# Patient Record
Sex: FEMALE | Race: WHITE | Employment: FULL TIME | URBAN - METROPOLITAN AREA
[De-identification: names, ages, dates, MRNs, and addresses within clinical notes are randomized per-mention and may not be internally consistent; named-entity substitution may affect disease eponyms.]

---

## 2017-01-04 ENCOUNTER — TELEPHONE (OUTPATIENT)
Dept: INTERNAL MEDICINE CLINIC | Facility: CLINIC | Age: 57
End: 2017-01-04

## 2017-01-04 NOTE — TELEPHONE ENCOUNTER
Incoming (mail or fax):  fax  Received from:  [facility]  Documentation given to:  Dr. Solomon Harris

## 2017-01-10 ENCOUNTER — MED REC SCAN ONLY (OUTPATIENT)
Dept: INTERNAL MEDICINE CLINIC | Facility: CLINIC | Age: 57
End: 2017-01-10

## 2017-05-11 ENCOUNTER — TELEPHONE (OUTPATIENT)
Dept: INTERNAL MEDICINE CLINIC | Facility: CLINIC | Age: 57
End: 2017-05-11

## 2017-05-11 NOTE — TELEPHONE ENCOUNTER
Received copy of multiple labs results done between 3/2016 and 4/2017 at [facility]. Ordered by Dr. Bernard Rodrigues. Results in consult folder.

## 2017-05-13 PROBLEM — R76.8 ANA POSITIVE: Status: ACTIVE | Noted: 2017-05-13

## 2017-05-16 ENCOUNTER — TELEPHONE (OUTPATIENT)
Dept: INTERNAL MEDICINE CLINIC | Facility: CLINIC | Age: 57
End: 2017-05-16

## 2017-05-16 NOTE — TELEPHONE ENCOUNTER
Incoming (mail or fax): Fax  Received from:  Dr. Stephany Peralta at [facility]  Documentation given to:  Dr. Estes Mouse consult folder.

## 2017-11-15 ENCOUNTER — HOSPITAL ENCOUNTER (OUTPATIENT)
Dept: MAMMOGRAPHY | Facility: HOSPITAL | Age: 57
Discharge: HOME | End: 2017-11-15
Attending: SURGERY
Payer: COMMERCIAL

## 2017-11-15 DIAGNOSIS — R92.1 BREAST CALCIFICATIONS: ICD-10-CM

## 2017-11-15 PROCEDURE — 77062 BREAST TOMOSYNTHESIS BI: CPT | Performed by: SURGERY

## 2017-11-15 PROCEDURE — 77066 DX MAMMO INCL CAD BI: CPT | Performed by: SURGERY

## 2017-11-15 PROCEDURE — 76642 ULTRASOUND BREAST LIMITED: CPT | Performed by: SURGERY

## 2017-11-25 ENCOUNTER — TELEPHONE (OUTPATIENT)
Dept: INTERNAL MEDICINE CLINIC | Facility: CLINIC | Age: 57
End: 2017-11-25

## 2017-11-27 ENCOUNTER — TELEPHONE (OUTPATIENT)
Dept: INTERNAL MEDICINE CLINIC | Facility: CLINIC | Age: 57
End: 2017-11-27

## 2017-11-27 NOTE — TELEPHONE ENCOUNTER
Incoming (mail or fax):  fax  Received from:  [address]  Documentation given to:  Dr Gaylene Galeazzi

## 2017-11-27 NOTE — TELEPHONE ENCOUNTER
Received fax from [facility] [place] re: labs done on 11/1/17. Included CMP and CBC within normal limits. To consult folder.

## 2018-03-21 ENCOUNTER — TELEPHONE (OUTPATIENT)
Dept: INTERNAL MEDICINE CLINIC | Facility: CLINIC | Age: 58
End: 2018-03-21

## 2018-09-11 ENCOUNTER — TELEPHONE (OUTPATIENT)
Dept: INTERNAL MEDICINE CLINIC | Facility: CLINIC | Age: 58
End: 2018-09-11

## 2018-09-11 NOTE — TELEPHONE ENCOUNTER
Incoming (mail or fax):  fax  Received from:  [facility]  Documentation given to:  Dr Delia Hale

## 2018-10-19 LAB
AMB EXT CREATININE: 1 MG/DL
AMB EXT GFR: 61
AMB EXT GLUCOSE: 86 MG/DL
AMB EXT HEMATOCRIT: 40.5
AMB EXT HEMOGLOBIN: 12.9
AMB EXT MCV: 92
AMB EXT PLATELETS: 298
AMB EXT WBC: 6.4 X10(3)UL

## 2018-10-30 ENCOUNTER — TELEPHONE (OUTPATIENT)
Dept: INTERNAL MEDICINE CLINIC | Facility: CLINIC | Age: 58
End: 2018-10-30

## 2018-10-30 NOTE — TELEPHONE ENCOUNTER
Lab test results received from [facility] and placed in folder. Routed to [facility].

## 2018-10-30 NOTE — TELEPHONE ENCOUNTER
Incoming (mail or fax):   Fax  Received from: [email or web address]  Documentation given to: Triage ([address])

## 2018-11-21 ENCOUNTER — HOSPITAL ENCOUNTER (OUTPATIENT)
Dept: MAMMOGRAPHY | Facility: HOSPITAL | Age: 58
Discharge: HOME | End: 2018-11-21
Attending: SURGERY
Payer: COMMERCIAL

## 2018-11-21 DIAGNOSIS — R92.1 MAMMOGRAPHIC CALCIFICATION: ICD-10-CM

## 2018-11-21 PROCEDURE — 77066 DX MAMMO INCL CAD BI: CPT | Performed by: SURGERY

## 2018-11-21 PROCEDURE — 76642 ULTRASOUND BREAST LIMITED: CPT | Performed by: SURGERY

## 2018-11-21 PROCEDURE — 77062 BREAST TOMOSYNTHESIS BI: CPT | Performed by: SURGERY

## 2018-12-12 ENCOUNTER — OFFICE VISIT (OUTPATIENT)
Dept: INTERNAL MEDICINE CLINIC | Facility: CLINIC | Age: 58
End: 2018-12-12
Payer: COMMERCIAL

## 2018-12-12 VITALS
HEIGHT: 64 IN | HEART RATE: 73 BPM | BODY MASS INDEX: 28.92 KG/M2 | TEMPERATURE: 98 F | DIASTOLIC BLOOD PRESSURE: 82 MMHG | SYSTOLIC BLOOD PRESSURE: 142 MMHG | OXYGEN SATURATION: 97 % | RESPIRATION RATE: 14 BRPM | WEIGHT: 169.38 LBS

## 2018-12-12 DIAGNOSIS — J45.20 MILD INTERMITTENT EXTRINSIC ASTHMA WITHOUT COMPLICATION: ICD-10-CM

## 2018-12-12 DIAGNOSIS — I10 ESSENTIAL HYPERTENSION: Primary | ICD-10-CM

## 2018-12-12 PROCEDURE — 99213 OFFICE O/P EST LOW 20 MIN: CPT | Performed by: NURSE PRACTITIONER

## 2018-12-12 RX ORDER — LISINOPRIL 10 MG/1
10 TABLET ORAL DAILY
Qty: 30 TABLET | Refills: 1 | Status: SHIPPED | OUTPATIENT
Start: 2018-12-12 | End: 2019-01-09

## 2018-12-12 NOTE — PROGRESS NOTES
Patient presents with:  Blood Pressure      HPI:  Presents for follow up of blood pressure. Stated was at [facility] office for routine visit and was told BP was slightly high.  Stated her mother's caregiver also checked her BP once and told her is was a little hig ankylosing (HCC)     HLA B27 (HLA B27 positive)     DOROTHY positive        Current Outpatient Medications:  Fexofenadine HCl 180 MG Oral Tab daily. Disp:  Rfl: 0   Meloxicam 15 MG Oral Tab Take 1 tablet by mouth daily.  Disp:  Rfl: 2   Secukinumab (COSENTYX SE in 4 weeks with BP record. Aware medications may need adjustment. Verbalized understanding of instructions and agreeable to this plan of care. Mild intermittent extrinsic asthma without complication- Stable. Continue current management.  ACT reviewed an

## 2018-12-12 NOTE — PATIENT INSTRUCTIONS
Get home blood pressure cuff and check blood pressure 2-3 times weekly. Keep record of readings and bring to office with you.      Sit calmly for 5 minutes prior to checking blood pressure and sit up straight with feet flat on the fl

## 2018-12-14 RX ORDER — LISINOPRIL 10 MG/1
TABLET ORAL
Qty: 90 TABLET | Refills: 1 | OUTPATIENT
Start: 2018-12-14

## 2018-12-31 ENCOUNTER — TELEPHONE (OUTPATIENT)
Dept: INTERNAL MEDICINE CLINIC | Facility: CLINIC | Age: 58
End: 2018-12-31

## 2018-12-31 DIAGNOSIS — I10 ESSENTIAL HYPERTENSION: Primary | ICD-10-CM

## 2018-12-31 DIAGNOSIS — E66.3 OVERWEIGHT (BMI 25.0-29.9): ICD-10-CM

## 2018-12-31 NOTE — TELEPHONE ENCOUNTER
Dre Love, HEATHER  [facility] Clinical Staff             I am not sure why this patient is going to this provider. I do not recall discussing nutrition with her. Please check with her and see why she is going (weight loss, fatigue etc.? ) and how to place ref

## 2019-01-02 NOTE — TELEPHONE ENCOUNTER
Spoke with Dale Barber at [address] [place] [address], she reviewed which referral to place, pended. Which Dx would I use? Essential HTN added, but for weight loss and possible arthritic pain (anti-inflammatory diet)?   Carmen Sigala referral likely will be placed tomorro

## 2019-01-08 ENCOUNTER — OFFICE VISIT (OUTPATIENT)
Dept: INTEGRATIVE MEDICINE | Facility: CLINIC | Age: 59
End: 2019-01-08
Payer: COMMERCIAL

## 2019-01-08 DIAGNOSIS — E66.3 OVERWEIGHT (BMI 25.0-29.9): ICD-10-CM

## 2019-01-08 DIAGNOSIS — I10 ESSENTIAL HYPERTENSION: ICD-10-CM

## 2019-01-08 PROCEDURE — 97802 MEDICAL NUTRITION INDIV IN: CPT | Performed by: NUTRITIONIST

## 2019-01-08 NOTE — PATIENT INSTRUCTIONS
The products and items listed below (the “Products”)  and their claims have not been evaluated by the Food and Drug Administration. Dietary products are not intended to treat, prevent, mitigate or cure disease.  Ultimately, you must draw your own conclusi Measurements  Chest Waist Hips L arm R arm L thigh R thigh Total   37 34 39 13 13 21 21 178   ___________  For any questions or concerns you may have, please do not hesitate to contact via   My Chart OR email- [email or web address]

## 2019-01-08 NOTE — PROGRESS NOTES
Patient referred by Dr. Joseph Chambers  Did patient complete Nutritional Therapy Questionnaire? Loreto Sharp is a 63year old female presenting for medical nutrition therapy in regards to weight loss.   Patient also has concerns with elevated blood pressure added sugar intake- work her way to 25 grams per day. Patient is snacking on sugary items at night, patient has to prioritize this change to stop. Also, educate on oils to cook at high temperatures.   Once patient has made some of these changes and are co

## 2019-01-09 ENCOUNTER — OFFICE VISIT (OUTPATIENT)
Dept: INTERNAL MEDICINE CLINIC | Facility: CLINIC | Age: 59
End: 2019-01-09
Payer: COMMERCIAL

## 2019-01-09 VITALS
HEIGHT: 64 IN | SYSTOLIC BLOOD PRESSURE: 122 MMHG | RESPIRATION RATE: 16 BRPM | WEIGHT: 168 LBS | HEART RATE: 72 BPM | BODY MASS INDEX: 28.68 KG/M2 | DIASTOLIC BLOOD PRESSURE: 68 MMHG

## 2019-01-09 DIAGNOSIS — I10 ESSENTIAL HYPERTENSION: Primary | ICD-10-CM

## 2019-01-09 PROCEDURE — 99213 OFFICE O/P EST LOW 20 MIN: CPT | Performed by: NURSE PRACTITIONER

## 2019-01-09 RX ORDER — AMLODIPINE BESYLATE 5 MG/1
5 TABLET ORAL DAILY
Qty: 30 TABLET | Refills: 1 | Status: SHIPPED | OUTPATIENT
Start: 2019-01-09 | End: 2019-01-09

## 2019-01-09 NOTE — PROGRESS NOTES
Deb Arias is a 63year old female. HPI:       Patient presents for follow up of HTN. Stated taking medications as ordered, w/o reported side effects. Is checking BP at home. Reported in the range of 120/80's.  Does feel she is having persistent \" (pulmonary embolism) 8/06    in the lungs - 5 years ago, attributed to [facility]   • Radiculopathy     of lumbosacral spine, cervical spine   • Spondylitis, ankylosing ([facility] [address] 9/13/2016   • subclinical hyperthyroidism     normalized   • Vitamin B 12 deficiency Sig: Take 1 tablet (5 mg total) by mouth daily. Imaging & Consults:  None    Return in about 4 weeks (around 2/6/2019). There are no Patient Instructions on file for this visit.

## 2019-01-11 RX ORDER — AMLODIPINE BESYLATE 5 MG/1
TABLET ORAL
Qty: 90 TABLET | Refills: 0 | Status: SHIPPED | OUTPATIENT
Start: 2019-01-11 | End: 2019-03-12

## 2019-01-29 ENCOUNTER — OFFICE VISIT (OUTPATIENT)
Dept: INTEGRATIVE MEDICINE | Facility: CLINIC | Age: 59
End: 2019-01-29
Payer: COMMERCIAL

## 2019-01-29 DIAGNOSIS — E66.3 OVERWEIGHT (BMI 25.0-29.9): ICD-10-CM

## 2019-01-29 DIAGNOSIS — I10 ESSENTIAL HYPERTENSION: ICD-10-CM

## 2019-01-29 PROCEDURE — 97803 MED NUTRITION INDIV SUBSEQ: CPT | Performed by: NUTRITIONIST

## 2019-01-29 NOTE — PROGRESS NOTES
Patient referred by Dr. Brooke More  Did patient complete Nutritional Therapy Questionnaire? Kwasi Cazares is a 63year old female presenting for medical nutrition therapy in regards to weight loss.  Since last visit, patient has changed her oils and has

## 2019-02-06 LAB
AMB EXT CREATININE: 0.9 MG/DL
AMB EXT GLUCOSE: 85 MG/DL
AMB EXT HEMATOCRIT: 40.3
AMB EXT HEMOGLOBIN: 13
AMB EXT MCV: 90
AMB EXT PLATELETS: 288
AMB EXT WBC: 6 X10(3)UL

## 2019-02-12 ENCOUNTER — OFFICE VISIT (OUTPATIENT)
Dept: INTERNAL MEDICINE CLINIC | Facility: CLINIC | Age: 59
End: 2019-02-12
Payer: COMMERCIAL

## 2019-02-12 VITALS
SYSTOLIC BLOOD PRESSURE: 130 MMHG | HEIGHT: 64 IN | HEART RATE: 76 BPM | BODY MASS INDEX: 28.03 KG/M2 | RESPIRATION RATE: 14 BRPM | DIASTOLIC BLOOD PRESSURE: 72 MMHG | WEIGHT: 164.19 LBS | OXYGEN SATURATION: 99 % | TEMPERATURE: 98 F

## 2019-02-12 DIAGNOSIS — I10 ESSENTIAL HYPERTENSION: Primary | ICD-10-CM

## 2019-02-12 DIAGNOSIS — K59.00 CONSTIPATION, UNSPECIFIED CONSTIPATION TYPE: ICD-10-CM

## 2019-02-12 PROCEDURE — 99213 OFFICE O/P EST LOW 20 MIN: CPT | Performed by: NURSE PRACTITIONER

## 2019-02-12 RX ORDER — LOSARTAN POTASSIUM 25 MG/1
25 TABLET ORAL DAILY
Qty: 30 TABLET | Refills: 1 | Status: SHIPPED | OUTPATIENT
Start: 2019-02-12 | End: 2019-03-12

## 2019-02-12 NOTE — PROGRESS NOTES
Dianna Thomas is a 63year old female. HPI:     Patient presents for follow up of HTN. At last visit on 1/9/19, lisinopril was stopped due to perception of \"constant tickle\" in her throat, and patient was started on amlodipine.  In office today stat • HLA B27 (HLA B27 positive) 12/22/2016   • Nipple discharge     left, bloody skin fissure   • PE (pulmonary embolism) 8/06    in the lungs - 5 years ago, attributed to [facility]   • Radiculopathy     of lumbosacral spine, cervical spine   • Spondylitis, ankylo Constipation, unspecified constipation type- related to amlodipine? Change medications as above. Notify office if no improvement in constipation. Verbalized understanding of instructions and agreeable to this plan of care.        No orders of the defined

## 2019-02-13 RX ORDER — LOSARTAN POTASSIUM 25 MG/1
TABLET ORAL
Qty: 90 TABLET | Refills: 1 | OUTPATIENT
Start: 2019-02-13

## 2019-02-14 ENCOUNTER — MED REC SCAN ONLY (OUTPATIENT)
Dept: INTERNAL MEDICINE CLINIC | Facility: CLINIC | Age: 59
End: 2019-02-14

## 2019-03-05 ENCOUNTER — OFFICE VISIT (OUTPATIENT)
Dept: INTEGRATIVE MEDICINE | Facility: CLINIC | Age: 59
End: 2019-03-05
Payer: COMMERCIAL

## 2019-03-05 DIAGNOSIS — I10 ESSENTIAL HYPERTENSION: ICD-10-CM

## 2019-03-05 DIAGNOSIS — Z71.3 NUTRITIONAL COUNSELING: ICD-10-CM

## 2019-03-05 DIAGNOSIS — E66.3 OVERWEIGHT (BMI 25.0-29.9): ICD-10-CM

## 2019-03-05 PROCEDURE — 97803 MED NUTRITION INDIV SUBSEQ: CPT | Performed by: NUTRITIONIST

## 2019-03-05 NOTE — PROGRESS NOTES
Patient referred by Dr. Lori Fuentes  Did patient complete Nutritional Therapy Questionnaire? Geeta Bowles is a 63year old female presenting for medical nutrition therapy in regards to weight loss.   Patient reports doing very well during the week with h

## 2019-03-06 ENCOUNTER — TELEPHONE (OUTPATIENT)
Dept: INTERNAL MEDICINE CLINIC | Facility: CLINIC | Age: 59
End: 2019-03-06

## 2019-03-06 NOTE — TELEPHONE ENCOUNTER
Incoming (mail or fax):  Fax  Received from: [facility]   Documentation given to: Triage (Blade Parker)    *Test Results

## 2019-03-06 NOTE — TELEPHONE ENCOUNTER
Labs ordered by rheumatologist, dated 2/6/19 received: CBC, CMP, Vit D  Routed to [facility] for review.

## 2019-03-07 NOTE — TELEPHONE ENCOUNTER
Labs noted. No significant abnormalities. Vit D slightly low, addressed by ordering provider per notes provided. All sent to scan.

## 2019-03-12 ENCOUNTER — OFFICE VISIT (OUTPATIENT)
Dept: INTERNAL MEDICINE CLINIC | Facility: CLINIC | Age: 59
End: 2019-03-12
Payer: COMMERCIAL

## 2019-03-12 VITALS
SYSTOLIC BLOOD PRESSURE: 118 MMHG | HEIGHT: 64 IN | HEART RATE: 88 BPM | RESPIRATION RATE: 16 BRPM | BODY MASS INDEX: 27.83 KG/M2 | DIASTOLIC BLOOD PRESSURE: 66 MMHG | WEIGHT: 163 LBS

## 2019-03-12 DIAGNOSIS — I10 ESSENTIAL HYPERTENSION: Primary | ICD-10-CM

## 2019-03-12 PROCEDURE — 99213 OFFICE O/P EST LOW 20 MIN: CPT | Performed by: NURSE PRACTITIONER

## 2019-03-12 RX ORDER — LOSARTAN POTASSIUM 25 MG/1
25 TABLET ORAL DAILY
Qty: 90 TABLET | Refills: 0 | Status: SHIPPED | OUTPATIENT
Start: 2019-03-12 | End: 2019-06-12

## 2019-03-12 NOTE — PROGRESS NOTES
Zechariah Casiano is a 63year old female presenting for follow up of HTN   Patient presents with:  Blood Pressure: check    HPI:       Patient presents for follow up of HTN. Stated taking medications as ordered, w/o reported side effects.  Is not checking BP Spondylitis, ankylosing ([facility] [address] 9/13/2016   • subclinical hyperthyroidism     normalized   • Vitamin B 12 deficiency     oral replacement      Past Surgical History:   Procedure Laterality Date   • COLONOSCOPY,BIOPSY  07/2011   • HIP REPLACEMENT SURGERY  Feb You are overdue for your annual physical exam. This is a good opportunity to meet Dr. Alexx Leavitt, or I would be happy to do your physical as well. Please schedule appointment for physical as soon as possible.

## 2019-03-12 NOTE — PATIENT INSTRUCTIONS
You are overdue for your annual physical exam. This is a good opportunity to meet Dr. Elvia Arnett, or I would be happy to do your physical as well. Please schedule appointment for physical as soon as possible.

## 2019-04-02 ENCOUNTER — OFFICE VISIT (OUTPATIENT)
Dept: INTEGRATIVE MEDICINE | Facility: CLINIC | Age: 59
End: 2019-04-02
Payer: COMMERCIAL

## 2019-04-02 DIAGNOSIS — I10 ESSENTIAL HYPERTENSION: ICD-10-CM

## 2019-04-02 DIAGNOSIS — E66.3 OVERWEIGHT (BMI 25.0-29.9): ICD-10-CM

## 2019-04-02 PROCEDURE — 97803 MED NUTRITION INDIV SUBSEQ: CPT | Performed by: NUTRITIONIST

## 2019-04-02 NOTE — PROGRESS NOTES
Patient referred by Dr. Fajardo Band  Did patient complete Nutritional Therapy Questionnaire? Raine Groves is a 63year old female presenting for medical nutrition therapy in regards to weight loss.   Just returned from [facility] on Sunday with sister

## 2019-05-02 ENCOUNTER — OFFICE VISIT (OUTPATIENT)
Dept: FAMILY MEDICINE CLINIC | Facility: CLINIC | Age: 59
End: 2019-05-02
Payer: COMMERCIAL

## 2019-05-02 VITALS — WEIGHT: 160.63 LBS | BODY MASS INDEX: 28 KG/M2

## 2019-05-02 DIAGNOSIS — E05.90 HYPERTHYROIDISM: ICD-10-CM

## 2019-05-02 DIAGNOSIS — I10 ESSENTIAL HYPERTENSION: Primary | ICD-10-CM

## 2019-05-02 PROCEDURE — 99214 OFFICE O/P EST MOD 30 MIN: CPT | Performed by: PHYSICIAN ASSISTANT

## 2019-05-02 NOTE — PATIENT INSTRUCTIONS
Do not punish yourself for cheating. Go back to eating healthy the next day. Stop DIET COKE! Stop eating after dinner. Stop cake and sweets. Reward yourself with something other than food. Keep up the workouts! You Can do it!

## 2019-05-02 NOTE — PROGRESS NOTES
Mayelin Lewis is a 63year old female. Patient presents with:  Nutrition Counseling      HPI:   Working out more. Stretching kickboxing, walking, strength training. Michael Alpha off her diet after having cake and wine on Easter.  Started having cake or a sweet skin fissure   • PE (pulmonary embolism) 8/06    in the lungs - 5 years ago, attributed to [facility]   • Radiculopathy     of lumbosacral spine, cervical spine   • Spondylitis, ankylosing ([facility] [address] 9/13/2016   • subclinical hyperthyroidism     normalized   • Vitamin 0.5 oz        Types: 1 drink(s) per week        Comment: rarely      Drug use: No      Sexual activity: Not on file    Lifestyle      Physical activity:        Days per week: Not on file        Minutes per session: Not on file      Stress: Not on file    R She is oriented to person, place, and time and well-developed, well-nourished, and in no distress. No distress. HENT:   Head: Normocephalic and atraumatic. Neurological: She is alert and oriented to person, place, and time.  Gait normal.   Skin: Skin is

## 2019-05-29 ENCOUNTER — HOSPITAL ENCOUNTER (OUTPATIENT)
Age: 59
Discharge: HOME | End: 2019-05-29
Attending: EMERGENCY MEDICINE
Payer: COMMERCIAL

## 2019-05-29 VITALS
DIASTOLIC BLOOD PRESSURE: 48 MMHG | TEMPERATURE: 101 F | OXYGEN SATURATION: 96 % | RESPIRATION RATE: 18 BRPM | SYSTOLIC BLOOD PRESSURE: 123 MMHG | HEART RATE: 86 BPM

## 2019-05-29 DIAGNOSIS — J20.9 ACUTE BRONCHITIS, UNSPECIFIED ORGANISM: Primary | ICD-10-CM

## 2019-05-29 PROCEDURE — 99204 OFFICE O/P NEW MOD 45 MIN: CPT

## 2019-05-29 PROCEDURE — 99213 OFFICE O/P EST LOW 20 MIN: CPT

## 2019-05-29 RX ORDER — AZITHROMYCIN 250 MG/1
TABLET, FILM COATED ORAL
Qty: 1 PACKAGE | Refills: 0 | Status: SHIPPED | OUTPATIENT
Start: 2019-05-29 | End: 2019-06-04 | Stop reason: ALTCHOICE

## 2019-05-29 NOTE — ED INITIAL ASSESSMENT (HPI)
The patient is here with complaints of a dry cough and some pain to the glands of her neck x 5-7 days. She states she believes the cough may be reactive airway. She has little to no relief with her inhaler.   She has had chills and felt warm but hasn't ta

## 2019-05-29 NOTE — ED PROVIDER NOTES
Patient Seen in: [address]    History   Patient presents with:  Cough/URI    Stated Complaint: cough, x5days     HPI    16-year-old female complaining of cough the patient had a dry cough for about 5 to 7days she denies an stated complaint: cough, x5days   Other systems are as noted in HPI. Constitutional and vital signs reviewed. All other systems reviewed and negative except as noted above.     Physical Exam     ED Triage Vitals [05/29/19 1620]   /48   Pulse 86

## 2019-06-03 ENCOUNTER — TELEPHONE (OUTPATIENT)
Dept: INTERNAL MEDICINE CLINIC | Facility: CLINIC | Age: 59
End: 2019-06-03

## 2019-06-03 NOTE — TELEPHONE ENCOUNTER
Patient calling was seen at [facility] 05-29-19 prescribed a z pack for bronchitis. Patient stated her fever has gone away but her cough is persistent and she now has mouth sores/tender gums.  Patient would like a call back today to schedule UC follow up amira

## 2019-06-03 NOTE — TELEPHONE ENCOUNTER
LM to follow up with patient. OK to schedule OV with Cedric Quintanilla for 6/4/19 when she returns the call.

## 2019-06-04 ENCOUNTER — OFFICE VISIT (OUTPATIENT)
Dept: INTERNAL MEDICINE CLINIC | Facility: CLINIC | Age: 59
End: 2019-06-04
Payer: COMMERCIAL

## 2019-06-04 VITALS
RESPIRATION RATE: 16 BRPM | HEIGHT: 64 IN | HEART RATE: 92 BPM | SYSTOLIC BLOOD PRESSURE: 112 MMHG | BODY MASS INDEX: 26.98 KG/M2 | OXYGEN SATURATION: 98 % | DIASTOLIC BLOOD PRESSURE: 58 MMHG | WEIGHT: 158 LBS | TEMPERATURE: 98 F

## 2019-06-04 DIAGNOSIS — R05.9 COUGH: Primary | ICD-10-CM

## 2019-06-04 DIAGNOSIS — K13.79 MOUTH SORES: ICD-10-CM

## 2019-06-04 PROCEDURE — 99213 OFFICE O/P EST LOW 20 MIN: CPT | Performed by: NURSE PRACTITIONER

## 2019-06-04 RX ORDER — PREDNISONE 10 MG/1
TABLET ORAL
Qty: 39 TABLET | Refills: 0 | Status: SHIPPED | OUTPATIENT
Start: 2019-06-04 | End: 2019-07-16

## 2019-06-04 NOTE — PATIENT INSTRUCTIONS
Prednisone 10mg tabs- take as below (take all tabs for the day in the morning, at the same time): Take 6 tabs daily for 3 days, take 4 tabs daily for 3 days, take 2 tabs daily for 3 days, take 1 tab daily for 3 days.

## 2019-06-04 NOTE — PROGRESS NOTES
Patient presents with:  Cough: very dry, she is thinking it is her asthma, swollen glands  Lesion: gums are very inflammed, she does have mouth sores       HPI:  Presents for follow up of UC visit on 5/29/19 for complaints of cough, was noted with elevated Extrinsic asthma     Hyperthyroidism     Cerebral ventriculomegaly     Carpal tunnel syndrome     Cubital tunnel syndrome     Vitamin B 12 deficiency     Radiculopathy     Depression     Anxiety     Back pain     Diverticulitis     Status post excision of without exudate or edema. Noted with several 1-2mm white appearing lesions w/o drainage or exudate. Buccual mucosa erythematous. (+)PND. No facial tenderness. Eyes: Conjunctivae are pink and moist without exudate or drainage.    Lymphadenopathy: No cervic

## 2019-06-12 ENCOUNTER — TELEPHONE (OUTPATIENT)
Dept: INTERNAL MEDICINE CLINIC | Facility: CLINIC | Age: 59
End: 2019-06-12

## 2019-06-12 ENCOUNTER — OFFICE VISIT (OUTPATIENT)
Dept: INTERNAL MEDICINE CLINIC | Facility: CLINIC | Age: 59
End: 2019-06-12
Payer: COMMERCIAL

## 2019-06-12 VITALS
DIASTOLIC BLOOD PRESSURE: 64 MMHG | SYSTOLIC BLOOD PRESSURE: 110 MMHG | HEIGHT: 64 IN | WEIGHT: 152 LBS | HEART RATE: 76 BPM | RESPIRATION RATE: 16 BRPM | BODY MASS INDEX: 25.95 KG/M2 | OXYGEN SATURATION: 98 % | TEMPERATURE: 98 F

## 2019-06-12 DIAGNOSIS — I10 ESSENTIAL HYPERTENSION: Primary | ICD-10-CM

## 2019-06-12 DIAGNOSIS — J02.9 SORE THROAT: Primary | ICD-10-CM

## 2019-06-12 DIAGNOSIS — R05.9 COUGH: ICD-10-CM

## 2019-06-12 PROCEDURE — 99213 OFFICE O/P EST LOW 20 MIN: CPT | Performed by: NURSE PRACTITIONER

## 2019-06-12 RX ORDER — MONTELUKAST SODIUM 10 MG/1
10 TABLET ORAL NIGHTLY
Qty: 30 TABLET | Refills: 0 | Status: SHIPPED | OUTPATIENT
Start: 2019-06-12 | End: 2020-04-01

## 2019-06-12 RX ORDER — AMOXICILLIN AND CLAVULANATE POTASSIUM 875; 125 MG/1; MG/1
1 TABLET, FILM COATED ORAL 2 TIMES DAILY
Qty: 20 TABLET | Refills: 0 | Status: SHIPPED | OUTPATIENT
Start: 2019-06-12 | End: 2019-06-22

## 2019-06-12 NOTE — PROGRESS NOTES
Patient presents with: Follow - Up: 1 week, getting cough back, sore thoat      HPI:  Presents for follow up of visit on 6/4/19 for complaints of cough and oral sores.  Was managed with prednisone at that visit (had previously been seen in [address] [place] [address] on 5/29/19 an B27 (HLA B27 positive)     DOROTHY positive     Essential hypertension        Current Outpatient Medications:  Montelukast Sodium 10 MG Oral Tab Take 1 tablet (10 mg total) by mouth nightly.  Disp: 30 tablet Rfl: 0   Amoxicillin-Pot Clavulanate 875-125 MG Oral drainage. Lymphadenopathy: No cervical adenopathy. Cardiovascular: Normal rate, regular rhythm. No murmur. Pulmonary/Chest: No respiratory distress. Effort normal. Breath sounds clear bilaterally. No wheezes, rhonchi or rales appreciated.  Stoney Patches

## 2019-06-13 RX ORDER — LOSARTAN POTASSIUM 25 MG/1
TABLET ORAL
Qty: 30 TABLET | Refills: 0 | Status: SHIPPED | OUTPATIENT
Start: 2019-06-13 | End: 2019-07-18

## 2019-06-13 RX ORDER — MONTELUKAST SODIUM 10 MG/1
TABLET ORAL
Qty: 90 TABLET | Refills: 0 | OUTPATIENT
Start: 2019-06-13

## 2019-06-13 NOTE — TELEPHONE ENCOUNTER
PSR - Overdue for PE. Please establish with Dr. Geneva Ricardo. Has not ever seen Dr. Jossie Lester, only [facility] [facility]/Dr. Corey Kirby. Thanks!

## 2019-06-19 ENCOUNTER — TELEPHONE (OUTPATIENT)
Dept: INTERNAL MEDICINE CLINIC | Facility: CLINIC | Age: 59
End: 2019-06-19

## 2019-07-16 ENCOUNTER — APPOINTMENT (OUTPATIENT)
Dept: LAB | Facility: REFERENCE LAB | Age: 59
End: 2019-07-16
Attending: FAMILY MEDICINE
Payer: COMMERCIAL

## 2019-07-16 ENCOUNTER — OFFICE VISIT (OUTPATIENT)
Dept: INTEGRATIVE MEDICINE | Facility: CLINIC | Age: 59
End: 2019-07-16
Payer: COMMERCIAL

## 2019-07-16 VITALS
DIASTOLIC BLOOD PRESSURE: 82 MMHG | BODY MASS INDEX: 25.78 KG/M2 | HEIGHT: 64 IN | SYSTOLIC BLOOD PRESSURE: 120 MMHG | HEART RATE: 72 BPM | WEIGHT: 151 LBS | OXYGEN SATURATION: 94 %

## 2019-07-16 DIAGNOSIS — R76.8 ANA POSITIVE: ICD-10-CM

## 2019-07-16 DIAGNOSIS — E66.3 OVERWEIGHT (BMI 25.0-29.9): Primary | ICD-10-CM

## 2019-07-16 DIAGNOSIS — Z15.89 HLA B27 (HLA B27 POSITIVE): ICD-10-CM

## 2019-07-16 DIAGNOSIS — E05.90 HYPERTHYROIDISM: ICD-10-CM

## 2019-07-16 DIAGNOSIS — J45.20 MILD INTERMITTENT EXTRINSIC ASTHMA WITHOUT COMPLICATION: ICD-10-CM

## 2019-07-16 DIAGNOSIS — E66.3 OVERWEIGHT (BMI 25.0-29.9): ICD-10-CM

## 2019-07-16 DIAGNOSIS — E53.8 VITAMIN B 12 DEFICIENCY: ICD-10-CM

## 2019-07-16 DIAGNOSIS — I10 ESSENTIAL HYPERTENSION: ICD-10-CM

## 2019-07-16 DIAGNOSIS — F32.A DEPRESSION, UNSPECIFIED DEPRESSION TYPE: ICD-10-CM

## 2019-07-16 DIAGNOSIS — F41.9 ANXIETY: ICD-10-CM

## 2019-07-16 DIAGNOSIS — R53.82 CHRONIC FATIGUE: ICD-10-CM

## 2019-07-16 DIAGNOSIS — M45.9 ANKYLOSING SPONDYLITIS, UNSPECIFIED SITE OF SPINE (HCC): ICD-10-CM

## 2019-07-16 LAB
DEPRECATED HBV CORE AB SER IA-ACNC: 76.2 NG/ML (ref 18–340)
DHEA-S SERPL-MCNC: 38.7 UG/DL
T3FREE SERPL-MCNC: 2.53 PG/ML (ref 2.4–4.2)
T4 FREE SERPL-MCNC: 0.9 NG/DL (ref 0.8–1.7)
THYROPEROXIDASE AB SERPL-ACNC: 44 U/ML (ref ?–60)
TSI SER-ACNC: 1.13 MIU/ML (ref 0.36–3.74)

## 2019-07-16 PROCEDURE — 99204 OFFICE O/P NEW MOD 45 MIN: CPT | Performed by: FAMILY MEDICINE

## 2019-07-16 PROCEDURE — 86376 MICROSOMAL ANTIBODY EACH: CPT | Performed by: FAMILY MEDICINE

## 2019-07-16 PROCEDURE — 84481 FREE ASSAY (FT-3): CPT | Performed by: FAMILY MEDICINE

## 2019-07-16 PROCEDURE — 82627 DEHYDROEPIANDROSTERONE: CPT | Performed by: FAMILY MEDICINE

## 2019-07-16 PROCEDURE — 84443 ASSAY THYROID STIM HORMONE: CPT | Performed by: FAMILY MEDICINE

## 2019-07-16 PROCEDURE — 86800 THYROGLOBULIN ANTIBODY: CPT | Performed by: FAMILY MEDICINE

## 2019-07-16 PROCEDURE — 36415 COLL VENOUS BLD VENIPUNCTURE: CPT | Performed by: FAMILY MEDICINE

## 2019-07-16 PROCEDURE — 84140 ASSAY OF PREGNENOLONE: CPT | Performed by: FAMILY MEDICINE

## 2019-07-16 PROCEDURE — 84439 ASSAY OF FREE THYROXINE: CPT | Performed by: FAMILY MEDICINE

## 2019-07-16 PROCEDURE — 82728 ASSAY OF FERRITIN: CPT | Performed by: FAMILY MEDICINE

## 2019-07-16 NOTE — PROGRESS NOTES
Anna Schmitz is a 63year old female.   Patient presents with:  Blood Pressure: pt has been on med since nov 2018  Low Back Pain: [id number]      HPI:     Started seeing Alexandro Cooper for MNT  Mom has end-stage Parkinson's disease, helping to manage her, seeing • Cerebral ventriculomegaly 01/26/2012    yearly neurologist Dr. Deann Godwin at [facility]   • Depression    • Eczema    • Extrinsic asthma, unspecified    • Hematochezia    • Hemorrhoids    • HLA B27 (HLA B27 positive) 12/22/2016   • Nipple discharge     lef Financial resource strain: Not on file      Food insecurity:        Worry: Not on file        Inability: Not on file      Transportation needs:        Medical: Not on file        Non-medical: Not on file    Tobacco Use      Smoking status: Never Smoker age 22 right knee   • Leep  2000   • Steven biopsy stereotactic nodule 2 site bilat  2012    benign-stromal fibrosis   • Other surgical history  09/26/2012    Excision of lipomatous mass - left arm performed by Dr. Denise Swift at [facility]   • Stereotactic - PREGNENOLONE BY MS/MS, SERUM; Future  - FERRITIN; Future  - ASSAY, THYROID STIM HORMONE; Future  - FREE T4 (FREE THYROXINE); Future  - THYROID ANTITHYROGLOBULIN AB; Future  - THYROID PEROXIDASE (TPO) AB; Future  - FREE T3 (TRIIODOTHYRONINE); Future    9. The products and items listed below (the “Products”)  and their claims have not been evaluated by the Food and Drug Administration. Dietary products are not intended to treat, prevent, mitigate or cure disease.  Ultimately, you must draw your own conclusion A liquid supplement for gut health. This is a carbon, soil-based product that helps to rebuild the tight junctions, or important connections between cells, in the intestines.  These tight junctions get compromised by daily stress, reduced immune function an · Gratitude can instill a sense of peace. Keep a gratitude journal and write down at least 5 things (or more!) that you are thankful for everyday. Life never seems as bad when you realize how much you have to be thankful for.    · Focus on positive things Conscious Breathing: Breathwork for Health, Stress Release, and Personal Mastery by Apollo Scott    The Miracle of Mindfulness by Josh Swan    Check out these resources on how meditation and deep breathing can transform your mind and body:    http:/

## 2019-07-16 NOTE — PATIENT INSTRUCTIONS
The products and items listed below (the “Products”)  and their claims have not been evaluated by the Food and Drug Administration. Dietary products are not intended to treat, prevent, mitigate or cure disease.  Ultimately, you must draw your own conclusion A liquid supplement for gut health. This is a carbon, soil-based product that helps to rebuild the tight junctions, or important connections between cells, in the intestines.  These tight junctions get compromised by daily stress, reduced immune function an · Gratitude can instill a sense of peace. Keep a gratitude journal and write down at least 5 things (or more!) that you are thankful for everyday. Life never seems as bad when you realize how much you have to be thankful for.    · Focus on positive things Conscious Breathing: Breathwork for Health, Stress Release, and Personal Mastery by Tano Aquino    The Miracle of Mindfulness by Karen Olvera    Check out these resources on how meditation and deep breathing can transform your mind and body:    http:/

## 2019-07-17 LAB — THYROGLOB SERPL-MCNC: 25 U/ML (ref ?–60)

## 2019-07-17 RX ORDER — MONTELUKAST SODIUM 10 MG/1
TABLET ORAL
Qty: 30 TABLET | Refills: 0 | OUTPATIENT
Start: 2019-07-17

## 2019-07-18 ENCOUNTER — PATIENT MESSAGE (OUTPATIENT)
Dept: INTEGRATIVE MEDICINE | Facility: CLINIC | Age: 59
End: 2019-07-18

## 2019-07-18 DIAGNOSIS — I10 ESSENTIAL HYPERTENSION: ICD-10-CM

## 2019-07-18 RX ORDER — LOSARTAN POTASSIUM 25 MG/1
TABLET ORAL
Qty: 90 TABLET | Refills: 0 | Status: SHIPPED | OUTPATIENT
Start: 2019-07-18 | End: 2019-09-10

## 2019-07-18 NOTE — TELEPHONE ENCOUNTER
From: Heike Banuelos  To: Kenzie Velasquez DO  Sent: 7/18/2019 12:24 PM CDT  Subject: Prescription Question    Hello,    I forgot to ask you for a refill of my Losartan 25 mg when we met on Tuesday. It was originally prescribed by Lupe Self.  I think I have

## 2019-07-18 NOTE — PROGRESS NOTES
See pt's MyChart message, pt requesting refill for Losartan 25 mg tabs. Message sent to pt to confirm qty and pharmacy.      A refill request was received for:  Requested Prescriptions     Pending Prescriptions Disp Refills   • Losartan Potassium 25 MG Oral

## 2019-07-20 LAB — PREGNENOLONE BY MS/MS, SERUM: 20 NG/DL

## 2019-09-03 ENCOUNTER — OFFICE VISIT (OUTPATIENT)
Dept: INTEGRATIVE MEDICINE | Facility: CLINIC | Age: 59
End: 2019-09-03
Payer: COMMERCIAL

## 2019-09-03 VITALS
WEIGHT: 155.19 LBS | BODY MASS INDEX: 26.49 KG/M2 | SYSTOLIC BLOOD PRESSURE: 132 MMHG | DIASTOLIC BLOOD PRESSURE: 86 MMHG | OXYGEN SATURATION: 99 % | TEMPERATURE: 98 F | HEART RATE: 74 BPM | HEIGHT: 64 IN

## 2019-09-03 DIAGNOSIS — M54.10 RADICULOPATHY, UNSPECIFIED SPINAL REGION: ICD-10-CM

## 2019-09-03 DIAGNOSIS — R76.8 ANA POSITIVE: ICD-10-CM

## 2019-09-03 DIAGNOSIS — E53.8 VITAMIN B 12 DEFICIENCY: Primary | ICD-10-CM

## 2019-09-03 DIAGNOSIS — F41.9 ANXIETY: ICD-10-CM

## 2019-09-03 DIAGNOSIS — F32.A DEPRESSION, UNSPECIFIED DEPRESSION TYPE: ICD-10-CM

## 2019-09-03 PROCEDURE — 99214 OFFICE O/P EST MOD 30 MIN: CPT | Performed by: PHYSICIAN ASSISTANT

## 2019-09-03 NOTE — PROGRESS NOTES
Donta Moore is a 63year old female. Patient presents with: Follow - Up: food sensativity f/ u        HPI:   Patient here to review micronutrient testing. Patient continue to work on diet. She has given up gluten which has helped with her pain.  Wor of lumbosacral spine, cervical spine   • Spondylitis, ankylosing ([facility] [address] 9/13/2016   • subclinical hyperthyroidism     normalized   • Vitamin B 12 deficiency     oral replacement       CURRENT MEDICATIONS:       Current Outpatient Medications:  Losartan Pot Alcohol/week: 0.8 standard drinks        Types: 1 drink(s) per week        Comment: rarely      Drug use: No      Sexual activity: Not on file    Lifestyle      Physical activity:        Days per week: Not on file        Minutes per session: Not on f PHYSICAL EXAM:      09/03/19  1525   BP: 132/86   Pulse: 74   Temp: 98.3 °F (36.8 °C)   SpO2: 99%   Weight: 155 lb 3.2 oz   Height: 64\"       Physical Exam   Constitutional: She is oriented to person, place, and time and well-developed, well-nourished, Directions: Start 1000mg daily and monitor for tolerance. Increase by 1000mg every 5 days until taking 4000mg daily. Reduce to lowest tolerated dose if diarrhea develops.   Why: to reduce inflammation and support cardiovascular health  [facility] [address]

## 2019-09-10 DIAGNOSIS — I10 ESSENTIAL HYPERTENSION: ICD-10-CM

## 2019-09-10 RX ORDER — LOSARTAN POTASSIUM 25 MG/1
TABLET ORAL
Qty: 90 TABLET | Refills: 0 | Status: SHIPPED | OUTPATIENT
Start: 2019-09-10 | End: 2019-11-09

## 2019-11-09 DIAGNOSIS — I10 ESSENTIAL HYPERTENSION: ICD-10-CM

## 2019-11-11 RX ORDER — LOSARTAN POTASSIUM 25 MG/1
TABLET ORAL
Qty: 90 TABLET | Refills: 0 | Status: SHIPPED | OUTPATIENT
Start: 2019-11-11 | End: 2020-05-11

## 2019-11-11 NOTE — TELEPHONE ENCOUNTER
A refill request was received for:  Requested Prescriptions     Pending Prescriptions Disp Refills   • LOSARTAN POTASSIUM 25 MG Oral Tab [Pharmacy Med Name: LOSARTAN 25MG TABLETS] 90 tablet 0     Sig: TAKE 1 TABLET(25 MG) BY MOUTH DAILY     Last refill day

## 2019-12-23 ENCOUNTER — TELEPHONE (OUTPATIENT)
Dept: INTEGRATIVE MEDICINE | Facility: CLINIC | Age: 59
End: 2019-12-23

## 2019-12-23 DIAGNOSIS — E66.3 OVERWEIGHT (BMI 25.0-29.9): Primary | ICD-10-CM

## 2019-12-23 NOTE — TELEPHONE ENCOUNTER
Referral pended. Please use \"Medical nutrition Therapy\" referral under the Integrative Medicine services next time. Thank you and Happy Monday.

## 2020-03-18 ENCOUNTER — TELEPHONE (OUTPATIENT)
Dept: INTEGRATIVE MEDICINE | Facility: CLINIC | Age: 60
End: 2020-03-18

## 2020-03-18 NOTE — TELEPHONE ENCOUNTER
PT CALLED STATED SHE HAS A SORE THROAT NO OTHER SYMPTOMS HAVE NOT TRAVELED. PT STATED SHE IS IMMUNOCOMPROMISED DUE TO HER ARTHRITIS.

## 2020-03-18 NOTE — TELEPHONE ENCOUNTER
Post nasal drip and sore throat for past couple of weeks, and cough. Patient would like to have a phone encounter with Jas franco Thurs March 19, 2020. She may have a sinus infection. Patient transferred to [facility] for phone encounter with Jas franco.

## 2020-03-19 ENCOUNTER — APPOINTMENT (OUTPATIENT)
Dept: INTEGRATIVE MEDICINE | Facility: CLINIC | Age: 60
End: 2020-03-19
Payer: COMMERCIAL

## 2020-03-19 ENCOUNTER — TELEPHONE (OUTPATIENT)
Dept: INTEGRATIVE MEDICINE | Facility: CLINIC | Age: 60
End: 2020-03-19

## 2020-03-19 DIAGNOSIS — J01.10 ACUTE NON-RECURRENT FRONTAL SINUSITIS: Primary | ICD-10-CM

## 2020-03-19 PROCEDURE — 99442 PHONE E/M BY PHYS 11-20 MIN: CPT | Performed by: PHYSICIAN ASSISTANT

## 2020-03-19 RX ORDER — AMOXICILLIN AND CLAVULANATE POTASSIUM 875; 125 MG/1; MG/1
1 TABLET, FILM COATED ORAL 2 TIMES DAILY
Qty: 14 TABLET | Refills: 0 | Status: SHIPPED | OUTPATIENT
Start: 2020-03-19 | End: 2020-03-26

## 2020-03-19 NOTE — TELEPHONE ENCOUNTER
Virtual/Telephone Check-In    [facility] verbally consents to a Virtual/Telephone Check-In service on 03/19/20. Patient understands and accepts financial responsibility for any deductible, co-insurance and/or co-pays associated with this service.

## 2020-03-24 ENCOUNTER — TELEPHONE (OUTPATIENT)
Dept: INTEGRATIVE MEDICINE | Facility: CLINIC | Age: 60
End: 2020-03-24

## 2020-03-24 NOTE — TELEPHONE ENCOUNTER
Called patient to check on her. She has a fever. Sore throat is better and cough is a little better. Still very fatigued. She is going to continue immune support and monitoring her symptoms. She will let us know if things worsen.

## 2020-03-24 NOTE — TELEPHONE ENCOUNTER
Patient is coughing more and a low grade fever, has been running 99.4- 101. 4. T  RN advised her to continue immune support that was given by [place].

## 2020-03-31 ENCOUNTER — TELEPHONE (OUTPATIENT)
Dept: INTEGRATIVE MEDICINE | Facility: CLINIC | Age: 60
End: 2020-03-31

## 2020-03-31 NOTE — TELEPHONE ENCOUNTER
Patient has been fever free for past 3 days, still has cough when she talks, she has an immunocompromised daugther and would like to be more prudent with keeping away from daughter. Symptoms started on March 17.   Would like a tele visit with Dr Valerie Jaramillo  Tel

## 2020-04-01 ENCOUNTER — VIRTUAL PHONE E/M (OUTPATIENT)
Dept: INTEGRATIVE MEDICINE | Facility: CLINIC | Age: 60
End: 2020-04-01
Payer: COMMERCIAL

## 2020-04-01 ENCOUNTER — PATIENT MESSAGE (OUTPATIENT)
Dept: INTEGRATIVE MEDICINE | Facility: CLINIC | Age: 60
End: 2020-04-01

## 2020-04-01 DIAGNOSIS — R50.9 FEVER, UNSPECIFIED FEVER CAUSE: ICD-10-CM

## 2020-04-01 DIAGNOSIS — J45.20 MILD INTERMITTENT EXTRINSIC ASTHMA WITHOUT COMPLICATION: Primary | ICD-10-CM

## 2020-04-01 DIAGNOSIS — J45.20 MILD INTERMITTENT EXTRINSIC ASTHMA WITHOUT COMPLICATION: ICD-10-CM

## 2020-04-01 DIAGNOSIS — Z91.09 ENVIRONMENTAL ALLERGIES: ICD-10-CM

## 2020-04-01 DIAGNOSIS — R05.9 COUGH: ICD-10-CM

## 2020-04-01 PROCEDURE — 99213 OFFICE O/P EST LOW 20 MIN: CPT | Performed by: FAMILY MEDICINE

## 2020-04-01 RX ORDER — MONTELUKAST SODIUM 10 MG/1
10 TABLET ORAL NIGHTLY
Qty: 30 TABLET | Refills: 0 | Status: SHIPPED | OUTPATIENT
Start: 2020-04-01 | End: 2020-04-01

## 2020-04-01 RX ORDER — BENZONATATE 100 MG/1
100 CAPSULE ORAL 3 TIMES DAILY PRN
Qty: 30 CAPSULE | Refills: 0 | Status: SHIPPED | OUTPATIENT
Start: 2020-04-01 | End: 2020-12-29

## 2020-04-01 RX ORDER — MONTELUKAST SODIUM 10 MG/1
TABLET ORAL
Qty: 90 TABLET | Refills: 0 | Status: SHIPPED | OUTPATIENT
Start: 2020-04-01 | End: 2020-12-29

## 2020-04-01 NOTE — TELEPHONE ENCOUNTER
RN  Has pended singlulair  Per patients  Request. Patient had  Phone  Visit  Today with  Dr Jessica Nelson

## 2020-04-01 NOTE — PATIENT INSTRUCTIONS
I have complete karlene in the body's ability to heal and transform. The products and items listed below (the “Products”)  and their claims have not been evaluated by the Food and Drug Administration.  Dietary products are not intended to treat, prevent, m

## 2020-04-01 NOTE — TELEPHONE ENCOUNTER
From: Kwesi Sorto  To: Rehan Tirado DO  Sent: 2020 12:26 PM CDT  Subject: Visit Follow-up Question    Thx for chatting today. My singulair is all . Could u send a prescription to [facility] ? Appreciate it.

## 2020-04-01 NOTE — PROGRESS NOTES
Brayan Fernandes is a 63year old female. Patient presents with: Follow - Up      HPI:     Virtual/Telephone Check-In    Brayan Fernandes verbally consents to a Virtual/Telephone Check-In service on 03/23/20.   Patient understands and accepts financial resp • benzonatate 100 MG Oral Cap Take 1 capsule (100 mg total) by mouth 3 (three) times daily as needed for cough.  30 capsule 0   • LOSARTAN POTASSIUM 25 MG Oral Tab TAKE 1 TABLET(25 MG) BY MOUTH DAILY 90 tablet 0   • Montelukast Sodium 10 MG Oral Tab Take on file      Stress: Not on file    Relationships      Social connections:        Talks on phone: Not on file        Gets together: Not on file        Attends [employer] service: Not on file        Active member of club or organization: Not on file        A cause    Add Tessalon perles and Singulair  Continue Symbicort and Allegra  Advised regarding CDC recommendations to come out of quarantine after being sick.      Given further recommendations as below    Orders Placed This Visit:  No orders of the defined

## 2020-05-09 DIAGNOSIS — I10 ESSENTIAL HYPERTENSION: ICD-10-CM

## 2020-05-11 RX ORDER — LOSARTAN POTASSIUM 25 MG/1
TABLET ORAL
Qty: 90 TABLET | Refills: 0 | Status: SHIPPED | OUTPATIENT
Start: 2020-05-11 | End: 2020-08-10

## 2020-06-11 ENCOUNTER — HOSPITAL ENCOUNTER (OUTPATIENT)
Dept: MAMMOGRAPHY | Facility: HOSPITAL | Age: 60
Discharge: HOME | End: 2020-06-11
Attending: SURGERY
Payer: COMMERCIAL

## 2020-06-11 DIAGNOSIS — R92.1 MAMMOGRAPHIC CALCIFICATION: ICD-10-CM

## 2020-06-11 PROCEDURE — 76642 ULTRASOUND BREAST LIMITED: CPT | Performed by: SURGERY

## 2020-06-11 PROCEDURE — 77066 DX MAMMO INCL CAD BI: CPT | Performed by: SURGERY

## 2020-06-11 PROCEDURE — 77062 BREAST TOMOSYNTHESIS BI: CPT | Performed by: SURGERY

## 2020-07-02 ENCOUNTER — TELEMEDICINE (OUTPATIENT)
Dept: INTEGRATIVE MEDICINE | Facility: CLINIC | Age: 60
End: 2020-07-02

## 2020-07-02 DIAGNOSIS — Z71.3 NUTRITIONAL COUNSELING: ICD-10-CM

## 2020-07-02 DIAGNOSIS — I10 ESSENTIAL HYPERTENSION: ICD-10-CM

## 2020-07-02 DIAGNOSIS — E66.3 OVERWEIGHT (BMI 25.0-29.9): ICD-10-CM

## 2020-07-02 DIAGNOSIS — Z71.3 DIETARY SURVEILLANCE AND COUNSELING: ICD-10-CM

## 2020-07-02 PROCEDURE — 97803 MED NUTRITION INDIV SUBSEQ: CPT | Performed by: NUTRITIONIST

## 2020-07-02 NOTE — PROGRESS NOTES
Patient verbally consents to a virtual audio-video consultation on 7/2/20. Patient understands and accepts financial responsibility for any deductible, co-insurance and/or co-pays associated with this service.     __________  Patient referred by Dr. Gideon Wilcox of visit, switched to virtual phone visit for remaining time)    Dietary Supplements:   none    LISA Hartman  7/2/2020  9:27 AM

## 2020-07-02 NOTE — PATIENT INSTRUCTIONS
The products and items listed below (the “Products”)  and their claims have not been evaluated by the Food and Drug Administration. Dietary products are not intended to treat, prevent, mitigate or cure disease.  Ultimately, you must draw your own conclusi “Keto”    Start to practice no food past 8pm    Looking forward to meeting back with you on the 20th!!!

## 2020-07-14 ENCOUNTER — TELEPHONE (OUTPATIENT)
Dept: INTEGRATIVE MEDICINE | Facility: CLINIC | Age: 60
End: 2020-07-14

## 2020-07-14 DIAGNOSIS — Z20.822 EXPOSURE TO COVID-19 VIRUS: Primary | ICD-10-CM

## 2020-07-14 NOTE — TELEPHONE ENCOUNTER
I ordered the antibody testing. Please also let the patient the following:   There may be limits to COVID-19 antibody testing as this testing may not reflect those who have been infected and didn't develop antibodies or those who developed antibodies that

## 2020-07-14 NOTE — TELEPHONE ENCOUNTER
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 She had a tele visit with Dr Chato Storm on 4/1/2020.   Pt would like have an order placed

## 2020-07-20 ENCOUNTER — APPOINTMENT (OUTPATIENT)
Dept: LAB | Age: 60
End: 2020-07-20
Attending: FAMILY MEDICINE
Payer: COMMERCIAL

## 2020-07-20 ENCOUNTER — OFFICE VISIT (OUTPATIENT)
Dept: INTEGRATIVE MEDICINE | Facility: CLINIC | Age: 60
End: 2020-07-20
Payer: COMMERCIAL

## 2020-07-20 DIAGNOSIS — I10 ESSENTIAL HYPERTENSION: ICD-10-CM

## 2020-07-20 DIAGNOSIS — Z20.822 EXPOSURE TO COVID-19 VIRUS: ICD-10-CM

## 2020-07-20 DIAGNOSIS — E66.3 OVERWEIGHT (BMI 25.0-29.9): ICD-10-CM

## 2020-07-20 LAB — SARS-COV-2 IGG SERPLBLD QL IA.RAPID: NEGATIVE

## 2020-07-20 PROCEDURE — 36415 COLL VENOUS BLD VENIPUNCTURE: CPT

## 2020-07-20 PROCEDURE — 97803 MED NUTRITION INDIV SUBSEQ: CPT | Performed by: NUTRITIONIST

## 2020-07-20 PROCEDURE — 86769 SARS-COV-2 COVID-19 ANTIBODY: CPT

## 2020-07-20 NOTE — PROGRESS NOTES
Patient referred by Dr. Ana Rosa Pritchett  Did patient complete Nutritional Therapy Questionnaire? YES  [email or web address] [email or web address] Nayak is a 63year old female presenting for medical nutrition therapy in regards to weight loss and lowering overall inflammati

## 2020-08-06 ENCOUNTER — OFFICE VISIT (OUTPATIENT)
Dept: INTEGRATIVE MEDICINE | Facility: CLINIC | Age: 60
End: 2020-08-06
Payer: COMMERCIAL

## 2020-08-06 VITALS
BODY MASS INDEX: 26.4 KG/M2 | OXYGEN SATURATION: 98 % | WEIGHT: 154.63 LBS | HEART RATE: 87 BPM | SYSTOLIC BLOOD PRESSURE: 126 MMHG | DIASTOLIC BLOOD PRESSURE: 70 MMHG | HEIGHT: 64 IN

## 2020-08-06 DIAGNOSIS — Z71.89 ADVICE GIVEN ABOUT COVID-19 VIRUS INFECTION: Primary | ICD-10-CM

## 2020-08-06 DIAGNOSIS — M54.50 ACUTE MIDLINE LOW BACK PAIN WITHOUT SCIATICA: ICD-10-CM

## 2020-08-06 PROCEDURE — 3078F DIAST BP <80 MM HG: CPT | Performed by: PHYSICIAN ASSISTANT

## 2020-08-06 PROCEDURE — 3074F SYST BP LT 130 MM HG: CPT | Performed by: PHYSICIAN ASSISTANT

## 2020-08-06 PROCEDURE — 3008F BODY MASS INDEX DOCD: CPT | Performed by: PHYSICIAN ASSISTANT

## 2020-08-06 PROCEDURE — 99213 OFFICE O/P EST LOW 20 MIN: CPT | Performed by: PHYSICIAN ASSISTANT

## 2020-08-06 NOTE — PATIENT INSTRUCTIONS
Dwaine Meehan Hemp CBD oil (Clarke Balm or Raw)    Where to Find: [employer]brandon  Directions: 5 drops under the tongue twice daily  Why: Leonor Morgans is a blend of full spectrum hemp oil and other essential nutrients    Zilis Topical CBD     Whe

## 2020-08-06 NOTE — PROGRESS NOTES
Donta Moore is a 63year old female. 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 Concerns about going back to school. Has autoimmune concern and asthma.  She • PE (pulmonary embolism) 8/06    in the lungs - 5 years ago, attributed to [facility]   • Radiculopathy     of lumbosacral spine, cervical spine   • Spondylitis, ankylosing ([facility] [address] 9/13/2016   • subclinical hyperthyroidism     normalized   • Vitamin B 12 deficiency Occupation: teacher    Social Needs      Financial resource strain: Not on file      Food insecurity:        Worry: Not on file        Inability: Not on file      Transportation needs:        Medical: Not on file        Non-medical: Not on file    [facility]  [facility]    Left Hip   • Knee arthroscopy      age 22 right knee   • Leep  2000   • Steven biopsy stereo nodule 1 site left (cpt=19081)  2012    benign   • Steven biopsy stereo nodule 2 site bilat (cpt=19081/56316)  2012    benign-stro No orders of the defined types were placed in this encounter. Patient Instructions   Lizzette Pérez CBD oil (Lou Menendez or Raw)    Where to Find: [employer]  Directions: 5 drops under the tongue twice daily  Why: Dilan Santos is a Saleem Cardoso

## 2020-08-10 DIAGNOSIS — I10 ESSENTIAL HYPERTENSION: ICD-10-CM

## 2020-08-10 RX ORDER — LOSARTAN POTASSIUM 25 MG/1
TABLET ORAL
Qty: 90 TABLET | Refills: 0 | Status: SHIPPED | OUTPATIENT
Start: 2020-08-10 | End: 2020-11-12

## 2020-09-05 ENCOUNTER — HOSPITAL ENCOUNTER (OUTPATIENT)
Age: 60
Discharge: HOME | End: 2020-09-05
Payer: COMMERCIAL

## 2020-09-05 VITALS
SYSTOLIC BLOOD PRESSURE: 119 MMHG | BODY MASS INDEX: 26 KG/M2 | HEART RATE: 76 BPM | DIASTOLIC BLOOD PRESSURE: 99 MMHG | WEIGHT: 154.31 LBS | OXYGEN SATURATION: 99 % | TEMPERATURE: 99 F | RESPIRATION RATE: 18 BRPM

## 2020-09-05 DIAGNOSIS — H00.016 HORDEOLUM EXTERNUM OF LEFT EYE, UNSPECIFIED EYELID: Primary | ICD-10-CM

## 2020-09-05 PROCEDURE — 99213 OFFICE O/P EST LOW 20 MIN: CPT | Performed by: NURSE PRACTITIONER

## 2020-09-05 RX ORDER — ERYTHROMYCIN 5 MG/G
1 OINTMENT OPHTHALMIC EVERY 6 HOURS
Qty: 1 G | Refills: 0 | Status: SHIPPED | OUTPATIENT
Start: 2020-09-05 | End: 2020-09-12

## 2020-09-05 NOTE — ED PROVIDER NOTES
Patient Seen in: [facility]      History   Patient presents with: Eye Visual Problem    Stated Complaint: LEFT EYE PAIN X 3 WEEKS    52-year-old female presents the immediate care with left eye irritation and redness.   [facility] Tobacco Use      Smoking status: Never Smoker      Smokeless tobacco: Never Used    Alcohol use: Yes      Alcohol/week: 0.8 standard drinks      Types: 1 drink(s) per week      Comment: rarely    Drug use:  No             Review of Systems   Constitutiona 81-year-old female presents the immediate care for left eye irritation, redness and drainage. Vital signs normal stable time of triage.   Hordeolum noted at the lateral portion of the left eyelid, plan to treat with warm compresses, erythromycin ointment a

## 2020-11-04 DIAGNOSIS — I10 ESSENTIAL HYPERTENSION: ICD-10-CM

## 2020-11-04 NOTE — TELEPHONE ENCOUNTER
A refill request was received for:  Requested Prescriptions     Pending Prescriptions Disp Refills   • Losartan Potassium 25 MG Oral Tab 90 tablet 0     Sig: Take 1 tablet (25 mg total) by mouth daily.      Last refill date: 08/10/2020  Qty:90  Last office

## 2020-11-11 RX ORDER — LOSARTAN POTASSIUM 25 MG/1
25 TABLET ORAL DAILY
Qty: 90 TABLET | Refills: 0 | OUTPATIENT
Start: 2020-11-11

## 2020-11-12 DIAGNOSIS — I10 ESSENTIAL HYPERTENSION: ICD-10-CM

## 2020-11-12 RX ORDER — LOSARTAN POTASSIUM 25 MG/1
25 TABLET ORAL DAILY
Qty: 90 TABLET | Refills: 0 | Status: SHIPPED | OUTPATIENT
Start: 2020-11-12 | End: 2021-02-26

## 2020-11-12 NOTE — TELEPHONE ENCOUNTER
Patient need med refill for Losartan potassium.  Has appointment on 12/29 for physical.    # [phone]

## 2020-11-12 NOTE — TELEPHONE ENCOUNTER
A refill request was received for:  Requested Prescriptions     Pending Prescriptions Disp Refills   • Losartan Potassium 25 MG Oral Tab 90 tablet 0     Sig: Take 1 tablet (25 mg total) by mouth daily.      Last refill date: 8.10.20  Qty: 90 tabs  Last offi

## 2020-12-29 ENCOUNTER — OFFICE VISIT (OUTPATIENT)
Dept: INTEGRATIVE MEDICINE | Facility: CLINIC | Age: 60
End: 2020-12-29

## 2020-12-29 VITALS
BODY MASS INDEX: 26.26 KG/M2 | HEIGHT: 64 IN | HEART RATE: 76 BPM | WEIGHT: 153.81 LBS | DIASTOLIC BLOOD PRESSURE: 62 MMHG | OXYGEN SATURATION: 97 % | SYSTOLIC BLOOD PRESSURE: 114 MMHG

## 2020-12-29 DIAGNOSIS — E66.3 OVERWEIGHT (BMI 25.0-29.9): ICD-10-CM

## 2020-12-29 DIAGNOSIS — Z00.00 ROUTINE MEDICAL EXAM: Primary | ICD-10-CM

## 2020-12-29 DIAGNOSIS — J45.20 MILD INTERMITTENT EXTRINSIC ASTHMA WITHOUT COMPLICATION: ICD-10-CM

## 2020-12-29 DIAGNOSIS — M85.80 OSTEOPENIA, UNSPECIFIED LOCATION: ICD-10-CM

## 2020-12-29 DIAGNOSIS — I10 ESSENTIAL HYPERTENSION: ICD-10-CM

## 2020-12-29 DIAGNOSIS — M45.9 ANKYLOSING SPONDYLITIS, UNSPECIFIED SITE OF SPINE (HCC): ICD-10-CM

## 2020-12-29 PROBLEM — Z96.642 STATUS POST TOTAL REPLACEMENT OF LEFT HIP: Status: ACTIVE | Noted: 2017-09-19

## 2020-12-29 PROBLEM — Z51.81 ENCOUNTER FOR MONITORING CHRONIC NSAID THERAPY: Status: ACTIVE | Noted: 2020-03-18

## 2020-12-29 PROBLEM — Z79.1 ENCOUNTER FOR MONITORING CHRONIC NSAID THERAPY: Status: RESOLVED | Noted: 2020-03-18 | Resolved: 2020-12-29

## 2020-12-29 PROBLEM — Z79.1 ENCOUNTER FOR MONITORING CHRONIC NSAID THERAPY: Status: ACTIVE | Noted: 2020-03-18

## 2020-12-29 PROBLEM — Z51.81 ENCOUNTER FOR MONITORING CHRONIC NSAID THERAPY: Status: RESOLVED | Noted: 2020-03-18 | Resolved: 2020-12-29

## 2020-12-29 PROCEDURE — 3078F DIAST BP <80 MM HG: CPT | Performed by: FAMILY MEDICINE

## 2020-12-29 PROCEDURE — 99396 PREV VISIT EST AGE 40-64: CPT | Performed by: FAMILY MEDICINE

## 2020-12-29 PROCEDURE — 3008F BODY MASS INDEX DOCD: CPT | Performed by: FAMILY MEDICINE

## 2020-12-29 PROCEDURE — 3074F SYST BP LT 130 MM HG: CPT | Performed by: FAMILY MEDICINE

## 2020-12-29 NOTE — PATIENT INSTRUCTIONS
I have complete karlene in the body's ability to heal and transform. The products and items listed below (the “Products”)  and their claims have not been evaluated by the Food and Drug Administration.  Dietary products are not intended to treat, prevent, m a daily log of your blood pressure. Take your blood pressure throughout the day. Goal blood pressure is <140/90    Tips  - Don't measure your blood pressure right after you wake up.  You can prepare for the day, but don't eat breakfast or take medication Dex Terry    Directions: 1 capsule before bedtime  Where: [employer] or [employer]

## 2020-12-29 NOTE — PROGRESS NOTES
Estela Peck is a 62year old female. Patient presents with:  Physical: no pap or breast exam - medication f/u (BP)  Asthma: AAP pending      HPI:     Mom  from Parkinson's last year. Dad is 81 yo and living in his own home.    Not exercising as m • Other (hypothyroidism) Sister    • Other (MS) Sister    • Other (arhtritis) Mother    • Other (prostate ca) Father    • Other (htn) Father    • Other (MI) Father    • Stroke Other    • Other (chronic migraines) Daughter    • Breast Cancer Paternal Aunt 9 children: Not on file      Years of education: Not on file      Highest education level: Not on file    Occupational History      Occupation: teacher    Social Needs      Financial resource strain: Not on file      Food insecurity        Worry: Not on file SURGICAL HISTORY:     Past Surgical History:   Procedure Laterality Date   • Colonoscopy,biopsy  07/2011   • Hip replacement surgery  Feb 2014    Left Hip   • Knee arthroscopy      age 22 right knee   • Leep  2000   • Steven biopsy stereo nodule 1 site left ( DIFFERENTIAL WITH PLATELET; Future  - COMP METABOLIC PANEL (14); Future  - DEHYDROEPIANDROSTERONE SULFATE; Future  - PREGNENOLONE BY MS/MS, SERUM; Future  - HEMOGLOBIN A1C; Future  - LIPID PANEL;  Future  - VITAMIN D, 25-HYDROXY; Future  - VITAMIN B6; Futur supplement, dietary, or exercise program, especially if you are pregnant or have any pre-existing injuries or medical conditions.  The patient agrees that the [facility] affiliates and its [facility] are not blood pressure slightly. - Sit quietly before and during monitoring. When you're ready to take your blood pressure, sit for five minutes in a comfortable position with your legs and ankles uncrossed and your back supported against a chair.  Try to be calm Patient affirmed understanding of plan and all questions were answered.      Toya Mauricio, DO

## 2021-01-04 ENCOUNTER — MED REC SCAN ONLY (OUTPATIENT)
Dept: INTEGRATIVE MEDICINE | Facility: CLINIC | Age: 61
End: 2021-01-04

## 2021-02-09 DIAGNOSIS — Z23 NEED FOR VACCINATION: ICD-10-CM

## 2021-02-13 ENCOUNTER — IMMUNIZATION (OUTPATIENT)
Dept: LAB | Age: 61
End: 2021-02-13
Attending: HOSPITALIST
Payer: COMMERCIAL

## 2021-02-13 DIAGNOSIS — Z23 NEED FOR VACCINATION: Primary | ICD-10-CM

## 2021-02-13 PROCEDURE — 0001A SARSCOV2 VAC 30MCG/0.3ML IM: CPT

## 2021-02-26 DIAGNOSIS — I10 ESSENTIAL HYPERTENSION: ICD-10-CM

## 2021-02-26 RX ORDER — LOSARTAN POTASSIUM 25 MG/1
TABLET ORAL
Qty: 90 TABLET | Refills: 1 | Status: SHIPPED | OUTPATIENT
Start: 2021-02-26 | End: 2021-08-25

## 2021-03-06 ENCOUNTER — IMMUNIZATION (OUTPATIENT)
Dept: LAB | Age: 61
End: 2021-03-06
Attending: HOSPITALIST
Payer: COMMERCIAL

## 2021-03-06 DIAGNOSIS — Z23 NEED FOR VACCINATION: Primary | ICD-10-CM

## 2021-03-06 PROCEDURE — 0002A SARSCOV2 VAC 30MCG/0.3ML IM: CPT

## 2021-03-31 ENCOUNTER — LAB ENCOUNTER (OUTPATIENT)
Dept: LAB | Age: 61
End: 2021-03-31
Attending: FAMILY MEDICINE
Payer: COMMERCIAL

## 2021-03-31 DIAGNOSIS — E66.3 OVERWEIGHT (BMI 25.0-29.9): ICD-10-CM

## 2021-03-31 DIAGNOSIS — M85.80 OSTEOPENIA, UNSPECIFIED LOCATION: ICD-10-CM

## 2021-03-31 DIAGNOSIS — Z00.00 ROUTINE MEDICAL EXAM: ICD-10-CM

## 2021-03-31 PROCEDURE — 82627 DEHYDROEPIANDROSTERONE: CPT

## 2021-03-31 PROCEDURE — 83735 ASSAY OF MAGNESIUM: CPT

## 2021-03-31 PROCEDURE — 84207 ASSAY OF VITAMIN B-6: CPT

## 2021-03-31 PROCEDURE — 36415 COLL VENOUS BLD VENIPUNCTURE: CPT

## 2021-03-31 PROCEDURE — 80061 LIPID PANEL: CPT

## 2021-03-31 PROCEDURE — 84140 ASSAY OF PREGNENOLONE: CPT

## 2021-03-31 PROCEDURE — 80053 COMPREHEN METABOLIC PANEL: CPT

## 2021-03-31 PROCEDURE — 82306 VITAMIN D 25 HYDROXY: CPT

## 2021-03-31 PROCEDURE — 85025 COMPLETE CBC W/AUTO DIFF WBC: CPT

## 2021-03-31 PROCEDURE — 83036 HEMOGLOBIN GLYCOSYLATED A1C: CPT

## 2021-03-31 PROCEDURE — 82607 VITAMIN B-12: CPT

## 2021-03-31 PROCEDURE — 84443 ASSAY THYROID STIM HORMONE: CPT

## 2021-04-03 ENCOUNTER — HOSPITAL ENCOUNTER (OUTPATIENT)
Dept: BONE DENSITY | Age: 61
Discharge: HOME | End: 2021-04-03
Attending: FAMILY MEDICINE
Payer: COMMERCIAL

## 2021-04-03 DIAGNOSIS — M85.80 OSTEOPENIA, UNSPECIFIED LOCATION: ICD-10-CM

## 2021-04-03 PROCEDURE — 77080 DXA BONE DENSITY AXIAL: CPT | Performed by: FAMILY MEDICINE

## 2021-04-06 RX ORDER — SERTRALINE HYDROCHLORIDE 100 MG/1
100 TABLET, FILM COATED ORAL DAILY
Qty: 90 TABLET | Refills: 0 | Status: SHIPPED | OUTPATIENT
Start: 2021-04-06 | End: 2021-07-15

## 2021-04-06 NOTE — TELEPHONE ENCOUNTER
Received vm from pt requesting sertraline 100 mg to be refilled x 90 days to [facility] [place]; pharm ph: [phone]. Med was previously refilled by a different provider. Routed to .  Please touch base with pt to get a follow up schedu

## 2021-04-06 NOTE — TELEPHONE ENCOUNTER
Contacted patient, she stated it was previously filled by her OBGYN who retired and is still in need of the medication / Scheduled 5/5

## 2021-07-02 ENCOUNTER — OFFICE VISIT (OUTPATIENT)
Dept: INTEGRATIVE MEDICINE | Facility: CLINIC | Age: 61
End: 2021-07-02
Payer: COMMERCIAL

## 2021-07-02 VITALS
OXYGEN SATURATION: 97 % | WEIGHT: 158.81 LBS | BODY MASS INDEX: 27.11 KG/M2 | DIASTOLIC BLOOD PRESSURE: 76 MMHG | HEART RATE: 80 BPM | HEIGHT: 64 IN | SYSTOLIC BLOOD PRESSURE: 128 MMHG

## 2021-07-02 DIAGNOSIS — Z01.419 ENCOUNTER FOR GYNECOLOGICAL EXAMINATION WITHOUT ABNORMAL FINDING: ICD-10-CM

## 2021-07-02 DIAGNOSIS — M45.9 ANKYLOSING SPONDYLITIS, UNSPECIFIED SITE OF SPINE (HCC): ICD-10-CM

## 2021-07-02 DIAGNOSIS — Z12.11 SCREEN FOR COLON CANCER: ICD-10-CM

## 2021-07-02 DIAGNOSIS — S39.012A STRAIN OF LUMBAR REGION, INITIAL ENCOUNTER: ICD-10-CM

## 2021-07-02 DIAGNOSIS — I10 ESSENTIAL HYPERTENSION: Primary | ICD-10-CM

## 2021-07-02 PROCEDURE — 3074F SYST BP LT 130 MM HG: CPT | Performed by: FAMILY MEDICINE

## 2021-07-02 PROCEDURE — 99214 OFFICE O/P EST MOD 30 MIN: CPT | Performed by: FAMILY MEDICINE

## 2021-07-02 PROCEDURE — 3008F BODY MASS INDEX DOCD: CPT | Performed by: FAMILY MEDICINE

## 2021-07-02 PROCEDURE — 3078F DIAST BP <80 MM HG: CPT | Performed by: FAMILY MEDICINE

## 2021-07-02 PROCEDURE — 87624 HPV HI-RISK TYP POOLED RSLT: CPT | Performed by: FAMILY MEDICINE

## 2021-07-02 PROCEDURE — 88175 CYTOPATH C/V AUTO FLUID REDO: CPT | Performed by: FAMILY MEDICINE

## 2021-07-02 RX ORDER — CYCLOBENZAPRINE HCL 5 MG
TABLET ORAL
COMMUNITY
Start: 2021-06-30 | End: 2021-07-02

## 2021-07-02 NOTE — PROGRESS NOTES
Marianne Baker is a 62year old female. Patient presents with:  Pap  Bump: behind L ear   Hip Pain: x 2 weeks       HPI:     A little extra stress.  diagnosed with early-onset Alz dx  Having right sided low back pain.  Has Misty wilkinson and called juan tablet (100 mg total) by mouth daily. 90 tablet 0   • LOSARTAN POTASSIUM 25 MG Oral Tab TAKE 1 TABLET(25 MG) BY MOUTH DAILY 90 tablet 1   • SYMBICORT 80-4.5 MCG/ACT Inhalation Aerosol INHALE 2 PUFFS INTO THE LUNGS BID     • Meloxicam 15 MG Oral Tab Take 7. Difficulty of Paying Living Expenses:   Food Insecurity:       Worried About [facility] in the Last Year:       Ran Out of Food in the Last Year:   Transportation Needs:       Lack of Transportation (Medical):       Lack of Transportation (Non-Med vaginitis  Cervical stenosis  Uterus and ovaries - wnl  Musculoskeletal:      Cervical back: Neck supple. Comments: Spasm of right lumbar spine   Skin:     General: Skin is warm and dry.    Neurological:      Mental Status: She is alert and oriented to either falsely elevated or falsely depressed results.   Intake of the recommended daily allowance (RDA) for biotin (0.03 mg) has not been shown to typically cause significant interference; however, high dose daily dietary supplements may contain biotin conc coronary heart disease risk factor. An HDL cholesterol >60 mg/dL is a negative risk factor for coronary heart disease.        • Triglycerides 03/31/2021 48  30 - 149 mg/dL Final    Reference interval for fasting triglycerides  Desirable: <150 mg/dL  Borderl Reference Range      Deficient:      <150 pg/mL      Indeterminate   150-192 pg/mL      Normal:         193-986 pg/mL      This test may exhibit interference when a sample is collected from a person who is consuming high dose of biotin (a.k.a., vitamin B7, Final   • RDW-SD 03/31/2021 42.2  35.1 - 46.3 fL Final   • Neutrophil Absolute Prelim 03/31/2021 2.09  1.50 - 7.70 x10 (3) uL Final   • Neutrophil Absolute 03/31/2021 2.09  1.50 - 7.70 x10(3) uL Final   • Lymphocyte Absolute 03/31/2021 1.85  1.00 - 4.00 x1 to the efficacy of the Product and immediately stop use of the Products if a negative reaction should arise.   You should always consult a licensed health care professional before starting any supplement, dietary, or exercise program, especially if you are

## 2021-07-06 ENCOUNTER — HOSPITAL ENCOUNTER (OUTPATIENT)
Dept: MAMMOGRAPHY | Facility: HOSPITAL | Age: 61
Discharge: HOME | End: 2021-07-06
Attending: SURGERY
Payer: COMMERCIAL

## 2021-07-06 DIAGNOSIS — R92.1 MAMMOGRAPHIC CALCIFICATION: ICD-10-CM

## 2021-07-06 LAB — HPV I/H RISK 1 DNA SPEC QL NAA+PROBE: NEGATIVE

## 2021-07-06 PROCEDURE — 77066 DX MAMMO INCL CAD BI: CPT | Performed by: SURGERY

## 2021-07-06 PROCEDURE — 77062 BREAST TOMOSYNTHESIS BI: CPT | Performed by: SURGERY

## 2021-07-15 RX ORDER — SERTRALINE HYDROCHLORIDE 100 MG/1
100 TABLET, FILM COATED ORAL DAILY
Qty: 90 TABLET | Refills: 1 | Status: SHIPPED | OUTPATIENT
Start: 2021-07-15 | End: 2021-12-27

## 2021-08-25 DIAGNOSIS — I10 ESSENTIAL HYPERTENSION: ICD-10-CM

## 2021-08-25 RX ORDER — LOSARTAN POTASSIUM 25 MG/1
TABLET ORAL
Qty: 90 TABLET | Refills: 1 | Status: SHIPPED | OUTPATIENT
Start: 2021-08-25 | End: 2021-12-27

## 2021-08-25 NOTE — TELEPHONE ENCOUNTER
A refill request was received for:  Requested Prescriptions     Pending Prescriptions Disp Refills   • LOSARTAN POTASSIUM 25 MG Oral Tab [Pharmacy Med Name: LOSARTAN 25MG TABLETS] 90 tablet 1     Sig: TAKE 1 TABLET(25 MG) BY MOUTH DAILY     Last refill day

## 2021-09-26 ENCOUNTER — HOSPITAL ENCOUNTER (OUTPATIENT)
Age: 61
Discharge: HOME | End: 2021-09-26
Payer: COMMERCIAL

## 2021-09-26 VITALS
OXYGEN SATURATION: 98 % | HEART RATE: 81 BPM | TEMPERATURE: 99 F | RESPIRATION RATE: 18 BRPM | DIASTOLIC BLOOD PRESSURE: 71 MMHG | SYSTOLIC BLOOD PRESSURE: 144 MMHG

## 2021-09-26 DIAGNOSIS — B02.8 HERPES ZOSTER WITH COMPLICATION: Primary | ICD-10-CM

## 2021-09-26 PROCEDURE — 99213 OFFICE O/P EST LOW 20 MIN: CPT | Performed by: PHYSICIAN ASSISTANT

## 2021-09-26 RX ORDER — VALACYCLOVIR HYDROCHLORIDE 1 G/1
1 TABLET, FILM COATED ORAL 3 TIMES DAILY
Qty: 21 TABLET | Refills: 0 | Status: SHIPPED | OUTPATIENT
Start: 2021-09-26 | End: 2021-10-03

## 2021-09-26 NOTE — ED INITIAL ASSESSMENT (HPI)
Pt is here for a rash on the right side of her neck. Pt states that she noticed the rash on Friday night and now it is getting itchy. Pt also has a hx of eczema.

## 2021-09-26 NOTE — ED PROVIDER NOTES
Patient Seen in: [facility]mediate [address] [place] [address]      History   Patient presents with:  Rash Skin Problem    Stated Complaint: rash x 3 days    Subjective:   HPI    60-year-old female who comes in today complaining of rash that started on the right side used    Alcohol use: Yes      Alcohol/week: 0.8 standard drinks      Types: 1 drink(s) per week      Comment: rarely    Drug use: No             Review of Systems    Positive for stated complaint: rash x 3 days  Other systems are as noted in HPI.   Constitu consistent with herpes zoster, discussed with patient shingles we discussed contact exposure. Patient to start Valtrex. Patient will follow up with PCP.     The patient is in good condition throughout her visit today and remains so upon discharge.  I disc

## 2021-10-21 NOTE — TELEPHONE ENCOUNTER
Patient notified our office that she will be changing PCPs. She has an appt scheduled with a new PCP in mid July. Lab Facility: [id number] Lab Facility: [id number]

## 2021-12-24 DIAGNOSIS — I10 ESSENTIAL HYPERTENSION: ICD-10-CM

## 2021-12-27 RX ORDER — SERTRALINE HYDROCHLORIDE 100 MG/1
TABLET, FILM COATED ORAL
Qty: 90 TABLET | Refills: 1 | Status: SHIPPED | OUTPATIENT
Start: 2021-12-27

## 2021-12-27 RX ORDER — LOSARTAN POTASSIUM 25 MG/1
TABLET ORAL
Qty: 90 TABLET | Refills: 1 | Status: SHIPPED | OUTPATIENT
Start: 2021-12-27

## 2021-12-27 NOTE — TELEPHONE ENCOUNTER
A refill request was received for:  Requested Prescriptions     Pending Prescriptions Disp Refills   • SERTRALINE 100 MG Oral Tab [Pharmacy Med Name: SERTRALINE 100MG TABLETS] 90 tablet 1     Sig: TAKE 1 TABLET(100 MG) BY MOUTH DAILY   • LOSARTAN 25 MG Ora

## 2022-03-01 ENCOUNTER — MED REC SCAN ONLY (OUTPATIENT)
Dept: INTEGRATIVE MEDICINE | Facility: CLINIC | Age: 62
End: 2022-03-01

## 2022-03-07 ENCOUNTER — HOSPITAL ENCOUNTER (OUTPATIENT)
Age: 62
Discharge: HOME | End: 2022-03-07
Payer: COMMERCIAL

## 2022-03-07 VITALS
OXYGEN SATURATION: 100 % | SYSTOLIC BLOOD PRESSURE: 125 MMHG | BODY MASS INDEX: 27.31 KG/M2 | HEART RATE: 68 BPM | TEMPERATURE: 98 F | DIASTOLIC BLOOD PRESSURE: 74 MMHG | HEIGHT: 64 IN | WEIGHT: 160 LBS | RESPIRATION RATE: 14 BRPM

## 2022-03-07 DIAGNOSIS — N30.00 ACUTE CYSTITIS WITHOUT HEMATURIA: Primary | ICD-10-CM

## 2022-03-07 LAB
POCT BILIRUBIN URINE: NEGATIVE
POCT KETONE URINE: NEGATIVE MG/DL
POCT LEUKOCYTE ESTERASE URINE: NEGATIVE
POCT NITRITE URINE: NEGATIVE
POCT PH URINE: 5.5 (ref 5–8)
POCT PROTEIN URINE: NEGATIVE MG/DL
POCT SPECIFIC GRAVITY URINE: 1.02
POCT URINE CLARITY: CLEAR
POCT URINE COLOR: YELLOW
POCT UROBILINOGEN URINE: 0.2 MG/DL

## 2022-03-07 PROCEDURE — 81002 URINALYSIS NONAUTO W/O SCOPE: CPT | Performed by: NURSE PRACTITIONER

## 2022-03-07 PROCEDURE — 99213 OFFICE O/P EST LOW 20 MIN: CPT | Performed by: NURSE PRACTITIONER

## 2022-03-07 RX ORDER — FLUCONAZOLE 150 MG/1
150 TABLET ORAL ONCE
Qty: 2 TABLET | Refills: 0 | Status: SHIPPED | OUTPATIENT
Start: 2022-03-07 | End: 2022-03-07

## 2022-03-07 RX ORDER — ONDANSETRON 4 MG/1
4 TABLET, ORALLY DISINTEGRATING ORAL EVERY 4 HOURS PRN
Qty: 20 TABLET | Refills: 0 | Status: SHIPPED | OUTPATIENT
Start: 2022-03-07 | End: 2022-03-14

## 2022-03-07 RX ORDER — CEPHALEXIN 500 MG/1
500 CAPSULE ORAL 2 TIMES DAILY
Qty: 10 CAPSULE | Refills: 0 | Status: SHIPPED | OUTPATIENT
Start: 2022-03-07 | End: 2022-03-12

## 2022-03-07 NOTE — ED INITIAL ASSESSMENT (HPI)
Pt here c/o urinary symptoms. Pt started having symptoms 3-4 days ago. Pain on urination, frequent urination and lower abd pain. Denies fever.

## 2022-03-14 DIAGNOSIS — I10 ESSENTIAL HYPERTENSION: ICD-10-CM

## 2022-03-14 RX ORDER — LOSARTAN POTASSIUM 25 MG/1
25 TABLET ORAL DAILY
Qty: 90 TABLET | Refills: 0 | Status: SHIPPED | OUTPATIENT
Start: 2022-03-14 | End: 2022-12-19

## 2022-04-10 NOTE — PATIENT INSTRUCTIONS
I have complete karlene in the body's ability to heal and transform. The products and items listed below (the “Products”)  and their claims have not been evaluated by the Food and Drug Administration.  Dietary products are not intended to treat, prevent, m
16-Dec-2021

## 2022-05-22 ENCOUNTER — APPOINTMENT (OUTPATIENT)
Dept: GENERAL RADIOLOGY | Age: 62
End: 2022-05-22
Attending: NURSE PRACTITIONER
Payer: COMMERCIAL

## 2022-05-22 ENCOUNTER — HOSPITAL ENCOUNTER (OUTPATIENT)
Age: 62
Discharge: HOME | End: 2022-05-22
Payer: COMMERCIAL

## 2022-05-22 VITALS
BODY MASS INDEX: 28.35 KG/M2 | HEIGHT: 63 IN | RESPIRATION RATE: 12 BRPM | WEIGHT: 160 LBS | TEMPERATURE: 98 F | SYSTOLIC BLOOD PRESSURE: 138 MMHG | OXYGEN SATURATION: 97 % | HEART RATE: 73 BPM | DIASTOLIC BLOOD PRESSURE: 69 MMHG

## 2022-05-22 DIAGNOSIS — Z11.52 ENCOUNTER FOR SCREENING FOR COVID-19: ICD-10-CM

## 2022-05-22 DIAGNOSIS — R05.9 COUGH: ICD-10-CM

## 2022-05-22 DIAGNOSIS — J18.9 COMMUNITY ACQUIRED PNEUMONIA OF LEFT LOWER LOBE OF LUNG: Primary | ICD-10-CM

## 2022-05-22 LAB — SARS-COV-2 RNA RESP QL NAA+PROBE: NOT DETECTED

## 2022-05-22 PROCEDURE — 71046 X-RAY EXAM CHEST 2 VIEWS: CPT | Performed by: NURSE PRACTITIONER

## 2022-05-22 PROCEDURE — 99214 OFFICE O/P EST MOD 30 MIN: CPT | Performed by: NURSE PRACTITIONER

## 2022-05-22 PROCEDURE — U0002 COVID-19 LAB TEST NON-CDC: HCPCS | Performed by: NURSE PRACTITIONER

## 2022-05-22 RX ORDER — LEVOFLOXACIN 750 MG/1
750 TABLET ORAL DAILY
Qty: 7 TABLET | Refills: 0 | Status: SHIPPED | OUTPATIENT
Start: 2022-05-22 | End: 2022-05-29

## 2022-05-22 RX ORDER — BENZONATATE 100 MG/1
100 CAPSULE ORAL 3 TIMES DAILY PRN
Qty: 30 CAPSULE | Refills: 0 | Status: SHIPPED | OUTPATIENT
Start: 2022-05-22 | End: 2022-06-21

## 2022-05-22 NOTE — ED INITIAL ASSESSMENT (HPI)
Patient reports having laryngitis for about 1 week cough x 4 days sore throat swollen glands. Negative home CoVid Monday.

## 2022-05-26 ENCOUNTER — OFFICE VISIT (OUTPATIENT)
Dept: INTEGRATIVE MEDICINE | Facility: CLINIC | Age: 62
End: 2022-05-26
Payer: COMMERCIAL

## 2022-05-26 VITALS
WEIGHT: 159.81 LBS | DIASTOLIC BLOOD PRESSURE: 70 MMHG | OXYGEN SATURATION: 98 % | HEART RATE: 76 BPM | SYSTOLIC BLOOD PRESSURE: 130 MMHG | HEIGHT: 63 IN | BODY MASS INDEX: 28.32 KG/M2

## 2022-05-26 DIAGNOSIS — Z00.00 ROUTINE MEDICAL EXAM: Primary | ICD-10-CM

## 2022-05-26 DIAGNOSIS — Z12.11 SCREEN FOR COLON CANCER: ICD-10-CM

## 2022-05-26 DIAGNOSIS — F32.A DEPRESSION, UNSPECIFIED DEPRESSION TYPE: ICD-10-CM

## 2022-05-26 DIAGNOSIS — F41.9 ANXIETY: ICD-10-CM

## 2022-05-26 DIAGNOSIS — E66.3 OVERWEIGHT (BMI 25.0-29.9): ICD-10-CM

## 2022-05-26 DIAGNOSIS — N85.2 UTERINE ENLARGEMENT: ICD-10-CM

## 2022-05-26 DIAGNOSIS — I10 ESSENTIAL HYPERTENSION: ICD-10-CM

## 2022-05-26 DIAGNOSIS — M45.9 ANKYLOSING SPONDYLITIS, UNSPECIFIED SITE OF SPINE (HCC): ICD-10-CM

## 2022-05-26 DIAGNOSIS — Z15.89 HLA B27 (HLA B27 POSITIVE): ICD-10-CM

## 2022-05-26 DIAGNOSIS — Z12.31 BREAST CANCER SCREENING BY MAMMOGRAM: ICD-10-CM

## 2022-05-26 PROCEDURE — 3008F BODY MASS INDEX DOCD: CPT | Performed by: FAMILY MEDICINE

## 2022-05-26 PROCEDURE — 3078F DIAST BP <80 MM HG: CPT | Performed by: FAMILY MEDICINE

## 2022-05-26 PROCEDURE — 99213 OFFICE O/P EST LOW 20 MIN: CPT | Performed by: FAMILY MEDICINE

## 2022-05-26 PROCEDURE — 99396 PREV VISIT EST AGE 40-64: CPT | Performed by: FAMILY MEDICINE

## 2022-05-26 PROCEDURE — 3075F SYST BP GE 130 - 139MM HG: CPT | Performed by: FAMILY MEDICINE

## 2022-05-26 RX ORDER — FOLIC ACID 1 MG/1
TABLET ORAL
COMMUNITY
Start: 2022-01-26 | End: 2022-05-26

## 2022-05-26 RX ORDER — BUPROPION HYDROCHLORIDE 150 MG/1
150 TABLET ORAL DAILY
Qty: 30 TABLET | Refills: 1 | Status: SHIPPED | OUTPATIENT
Start: 2022-05-26

## 2022-05-29 ENCOUNTER — HOSPITAL ENCOUNTER (OUTPATIENT)
Dept: ULTRASOUND IMAGING | Age: 62
End: 2022-05-29
Attending: FAMILY MEDICINE
Payer: COMMERCIAL

## 2022-05-29 ENCOUNTER — HOSPITAL ENCOUNTER (OUTPATIENT)
Dept: ULTRASOUND IMAGING | Age: 62
Discharge: HOME | End: 2022-05-29
Attending: FAMILY MEDICINE
Payer: COMMERCIAL

## 2022-05-29 DIAGNOSIS — N85.2 UTERINE ENLARGEMENT: ICD-10-CM

## 2022-05-29 PROCEDURE — 76830 TRANSVAGINAL US NON-OB: CPT | Performed by: FAMILY MEDICINE

## 2022-05-29 PROCEDURE — 76856 US EXAM PELVIC COMPLETE: CPT | Performed by: FAMILY MEDICINE

## 2022-06-03 ENCOUNTER — LAB ENCOUNTER (OUTPATIENT)
Dept: LAB | Facility: HOSPITAL | Age: 62
End: 2022-06-03
Attending: FAMILY MEDICINE
Payer: COMMERCIAL

## 2022-06-03 DIAGNOSIS — Z00.00 ROUTINE MEDICAL EXAM: ICD-10-CM

## 2022-06-03 DIAGNOSIS — F32.A DEPRESSION, UNSPECIFIED DEPRESSION TYPE: ICD-10-CM

## 2022-06-03 DIAGNOSIS — E66.3 OVERWEIGHT (BMI 25.0-29.9): ICD-10-CM

## 2022-06-03 LAB
DHEA-S SERPL-MCNC: 35.6 UG/DL
EST. AVERAGE GLUCOSE BLD GHB EST-MCNC: 117 MG/DL (ref 68–126)
HBA1C MFR BLD: 5.7 % (ref ?–5.7)
TSI SER-ACNC: 1.32 MIU/ML (ref 0.36–3.74)
VIT D+METAB SERPL-MCNC: 19.9 NG/ML (ref 30–100)

## 2022-06-03 PROCEDURE — 82627 DEHYDROEPIANDROSTERONE: CPT

## 2022-06-03 PROCEDURE — 83036 HEMOGLOBIN GLYCOSYLATED A1C: CPT

## 2022-06-03 PROCEDURE — 36415 COLL VENOUS BLD VENIPUNCTURE: CPT

## 2022-06-03 PROCEDURE — 84140 ASSAY OF PREGNENOLONE: CPT

## 2022-06-03 PROCEDURE — 82306 VITAMIN D 25 HYDROXY: CPT

## 2022-06-03 PROCEDURE — 84443 ASSAY THYROID STIM HORMONE: CPT

## 2022-06-07 LAB — PREGNENOLONE BY MS/MS, SERUM: 54 NG/DL

## 2022-06-22 RX ORDER — SERTRALINE HYDROCHLORIDE 100 MG/1
TABLET, FILM COATED ORAL
Qty: 90 TABLET | Refills: 1 | OUTPATIENT
Start: 2022-06-22

## 2022-07-06 ENCOUNTER — OFFICE VISIT (OUTPATIENT)
Dept: ORTHOPEDICS CLINIC | Facility: CLINIC | Age: 62
End: 2022-07-06
Payer: COMMERCIAL

## 2022-07-06 VITALS — BODY MASS INDEX: 27.31 KG/M2 | HEIGHT: 64 IN | WEIGHT: 160 LBS

## 2022-07-06 DIAGNOSIS — M67.40 MUCOID CYST OF JOINT: Primary | ICD-10-CM

## 2022-07-06 DIAGNOSIS — M79.671 RIGHT FOOT PAIN: ICD-10-CM

## 2022-07-06 DIAGNOSIS — M72.2 PLANTAR FIBROMATOSIS: ICD-10-CM

## 2022-07-06 DIAGNOSIS — M45.9 ANKYLOSING SPONDYLITIS, UNSPECIFIED SITE OF SPINE (HCC): ICD-10-CM

## 2022-07-06 DIAGNOSIS — M20.41 HAMMER TOE OF RIGHT FOOT: ICD-10-CM

## 2022-07-06 PROCEDURE — 3008F BODY MASS INDEX DOCD: CPT | Performed by: PODIATRIST

## 2022-07-06 PROCEDURE — 99203 OFFICE O/P NEW LOW 30 MIN: CPT | Performed by: PODIATRIST

## 2022-07-07 ENCOUNTER — TELEPHONE (OUTPATIENT)
Dept: INTEGRATIVE MEDICINE | Facility: CLINIC | Age: 62
End: 2022-07-07

## 2022-07-07 RX ORDER — BUPROPION HYDROCHLORIDE 150 MG/1
150 TABLET ORAL DAILY
Qty: 90 TABLET | Refills: 0 | Status: SHIPPED | OUTPATIENT
Start: 2022-07-07 | End: 2022-09-28

## 2022-07-07 NOTE — TELEPHONE ENCOUNTER
I spoke with pt, reviewed and discussed last office visit was 6/23. Patient started new doses on 6/24 - both. Said she and Dr. Lynette Horn discussed trying to wean off sertraline. However, reports feeling panicky, said she \"talks a mile a minute\", sees a therapist who picked up on this, HA, feels more anxious. Advised, let me confirm with provider if she can simply switch back to previous doses or how to do this safely. I will let her know once directions are received. If OK to switch back, should would need rx for bupropion 150 mg. Said she has 50 mg and scored 100 mg tabs of sertraline - would not need this refilled. Confirmed pharm on file is correct. LOV 6/23/22:  Sertraline reduced from 50 mg to 25 mg.    Bupropion XL increased from 150 mg to 300 mg

## 2022-07-07 NOTE — TELEPHONE ENCOUNTER
Left vm to confirm dates she adjusted doses, how long she has been on adjusted dose for both. Also, exactly what symptoms is she having.

## 2022-07-07 NOTE — TELEPHONE ENCOUNTER
Agree with going back to previous dose of both medications and sent Bupropion 150 mg to pharmacy. Follow-up with PCP.

## 2022-07-07 NOTE — TELEPHONE ENCOUNTER
Patient is taking Bupropion, recently received a dosage increase to 300mg. Requesting to have changed back to lower dose 150mg, patient was not feeling well with increased dose. Also taking Sertraline, dosage was recently lowered, requesting it to be changed back to higher dose 50. Please contact to discuss.  Thank you

## 2022-07-08 ENCOUNTER — HOSPITAL ENCOUNTER (OUTPATIENT)
Dept: MAMMOGRAPHY | Facility: HOSPITAL | Age: 62
Discharge: HOME | End: 2022-07-08
Attending: SURGERY
Payer: COMMERCIAL

## 2022-07-08 DIAGNOSIS — R92.1 MAMMOGRAPHIC CALCIFICATION: ICD-10-CM

## 2022-07-08 PROCEDURE — 76642 ULTRASOUND BREAST LIMITED: CPT | Performed by: SURGERY

## 2022-07-08 PROCEDURE — 77062 BREAST TOMOSYNTHESIS BI: CPT | Performed by: SURGERY

## 2022-07-08 PROCEDURE — 77066 DX MAMMO INCL CAD BI: CPT | Performed by: SURGERY

## 2022-09-28 RX ORDER — BUPROPION HYDROCHLORIDE 150 MG/1
TABLET ORAL
Qty: 90 TABLET | Refills: 0 | Status: SHIPPED | OUTPATIENT
Start: 2022-09-28

## 2022-12-19 DIAGNOSIS — I10 ESSENTIAL HYPERTENSION: ICD-10-CM

## 2022-12-19 RX ORDER — SERTRALINE HYDROCHLORIDE 100 MG/1
TABLET, FILM COATED ORAL
Qty: 30 TABLET | Refills: 1 | Status: SHIPPED | OUTPATIENT
Start: 2022-12-19

## 2022-12-19 RX ORDER — LOSARTAN POTASSIUM 25 MG/1
25 TABLET ORAL DAILY
Qty: 90 TABLET | Refills: 0 | Status: SHIPPED | OUTPATIENT
Start: 2022-12-19

## 2022-12-19 NOTE — TELEPHONE ENCOUNTER
Received request for losartan. Last prescribed 3/14/22. Last OV on 10/26/22 states bp was good       Hypertension: No signs of hypertension on today's exam.  Repeat blood pressure 120/70.   Discussed stress mitigation and continued follow-ups with therapist.        Collette Blew if ok to send

## 2023-01-18 ENCOUNTER — MED REC SCAN ONLY (OUTPATIENT)
Dept: INTEGRATIVE MEDICINE | Facility: CLINIC | Age: 63
End: 2023-01-18

## 2023-02-25 ENCOUNTER — TELEPHONE (OUTPATIENT)
Facility: CLINIC | Age: 63
End: 2023-02-25

## 2023-02-25 DIAGNOSIS — U07.1 COVID-19: Primary | ICD-10-CM

## 2023-02-25 NOTE — TELEPHONE ENCOUNTER
Dr. Elza Rodríguez patient is requesting refill on Sertraline 100 mg. Last office visit was 10/26/2022 and last refill was 12/19/2022. Please advise.

## 2023-02-25 NOTE — TELEPHONE ENCOUNTER
Patient called stating she tested positive for Covid this morning and is high risk so interested in taking Paxlovid. Symptoms started last night and has been having a headache and sore throat, but no shortness of breath. She does qualify for Paxlovid with normal GFR in November so sent to pharmacy. Cautioned that it may lower the effectiveness of her Bupropion temporarily to to watch out for worsening depression. Reviewed ED precautions as well as isolation and quarantine guidelines.

## 2023-02-27 RX ORDER — SERTRALINE HYDROCHLORIDE 100 MG/1
TABLET, FILM COATED ORAL
Qty: 30 TABLET | Refills: 1 | Status: SHIPPED | OUTPATIENT
Start: 2023-02-27

## 2023-03-25 DIAGNOSIS — I10 ESSENTIAL HYPERTENSION: ICD-10-CM

## 2023-03-27 ENCOUNTER — APPOINTMENT (OUTPATIENT)
Dept: GENERAL RADIOLOGY | Age: 63
End: 2023-03-27
Attending: EMERGENCY MEDICINE
Payer: COMMERCIAL

## 2023-03-27 ENCOUNTER — HOSPITAL ENCOUNTER (OUTPATIENT)
Age: 63
Discharge: HOME | End: 2023-03-27
Attending: EMERGENCY MEDICINE
Payer: COMMERCIAL

## 2023-03-27 VITALS
WEIGHT: 155 LBS | RESPIRATION RATE: 20 BRPM | DIASTOLIC BLOOD PRESSURE: 95 MMHG | TEMPERATURE: 98 F | SYSTOLIC BLOOD PRESSURE: 142 MMHG | HEIGHT: 64 IN | BODY MASS INDEX: 26.46 KG/M2 | OXYGEN SATURATION: 96 % | HEART RATE: 86 BPM

## 2023-03-27 DIAGNOSIS — J40 BRONCHITIS: Primary | ICD-10-CM

## 2023-03-27 RX ORDER — LOSARTAN POTASSIUM 25 MG/1
TABLET ORAL
Qty: 90 TABLET | Refills: 0 | Status: SHIPPED | OUTPATIENT
Start: 2023-03-27

## 2023-03-27 RX ORDER — PREDNISONE 20 MG/1
40 TABLET ORAL DAILY
Qty: 10 TABLET | Refills: 0 | Status: SHIPPED | OUTPATIENT
Start: 2023-03-27 | End: 2023-04-01

## 2023-03-27 NOTE — ED INITIAL ASSESSMENT (HPI)
Pt c/o cough and wheezing. Pt states her symptoms started on Saturday. Pt had covid 1 month ago. Pt states she's had 2 negative home tests the last negative covid test this morning.

## 2023-04-26 RX ORDER — SERTRALINE HYDROCHLORIDE 100 MG/1
TABLET, FILM COATED ORAL
Qty: 30 TABLET | Refills: 1 | Status: SHIPPED | OUTPATIENT
Start: 2023-04-26

## 2023-04-26 NOTE — TELEPHONE ENCOUNTER
A refill request was received for:  Requested Prescriptions     Pending Prescriptions Disp Refills    SERTRALINE 100 MG Oral Tab [Pharmacy Med Name: SERTRALINE 100MG TABLETS] 30 tablet 1     Sig: TAKE 1 TABLET(100 MG) BY MOUTH IN THE MORNING     Last refill date:  03/25/2023  Qty: 30  Last office visit: 10/26/2022  When is follow up due: 04/26/2023     Future Appointments   Date Time Provider Kirill Correa   5/9/2023  4:00 PM [address], Reyes Meek, DO [facility] INT MED [facility] [place]

## 2023-05-09 ENCOUNTER — OFFICE VISIT (OUTPATIENT)
Dept: INTEGRATIVE MEDICINE | Facility: CLINIC | Age: 63
End: 2023-05-09
Payer: COMMERCIAL

## 2023-05-09 VITALS
SYSTOLIC BLOOD PRESSURE: 110 MMHG | BODY MASS INDEX: 27 KG/M2 | DIASTOLIC BLOOD PRESSURE: 68 MMHG | OXYGEN SATURATION: 98 % | HEART RATE: 70 BPM | WEIGHT: 155.81 LBS

## 2023-05-09 DIAGNOSIS — Z00.00 WELL ADULT EXAM: Primary | ICD-10-CM

## 2023-05-09 DIAGNOSIS — I10 ESSENTIAL HYPERTENSION: ICD-10-CM

## 2023-05-09 DIAGNOSIS — Z15.89 HLA B27 (HLA B27 POSITIVE): ICD-10-CM

## 2023-05-09 DIAGNOSIS — M45.9 ANKYLOSING SPONDYLITIS, UNSPECIFIED SITE OF SPINE (HCC): ICD-10-CM

## 2023-05-09 DIAGNOSIS — R73.03 PREDIABETES: ICD-10-CM

## 2023-05-09 DIAGNOSIS — Z12.31 SCREENING MAMMOGRAM, ENCOUNTER FOR: ICD-10-CM

## 2023-05-09 DIAGNOSIS — Z12.11 ENCOUNTER FOR SCREENING COLONOSCOPY: ICD-10-CM

## 2023-05-09 PROBLEM — J45.909 EXTRINSIC ASTHMA: Status: ACTIVE | Noted: 2022-11-22

## 2023-05-09 PROBLEM — M54.9 BACKACHE: Status: ACTIVE | Noted: 2022-11-22

## 2023-05-09 PROBLEM — J45.909 EXTRINSIC ASTHMA (HCC): Status: ACTIVE | Noted: 2022-11-22

## 2023-05-09 PROCEDURE — 3074F SYST BP LT 130 MM HG: CPT | Performed by: FAMILY MEDICINE

## 2023-05-09 PROCEDURE — 3078F DIAST BP <80 MM HG: CPT | Performed by: FAMILY MEDICINE

## 2023-05-09 PROCEDURE — 99396 PREV VISIT EST AGE 40-64: CPT | Performed by: FAMILY MEDICINE

## 2023-05-09 RX ORDER — BUSPIRONE HYDROCHLORIDE 5 MG/1
5 TABLET ORAL 3 TIMES DAILY
Qty: 90 TABLET | Refills: 0 | Status: SHIPPED | OUTPATIENT
Start: 2023-05-09

## 2023-06-15 NOTE — TELEPHONE ENCOUNTER
A refill request was received for:  Requested Prescriptions     Pending Prescriptions Disp Refills    BUSPIRONE 5 MG Oral Tab [Pharmacy Med Name: BUSPIRONE 5MG TABLETS] 90 tablet 0     Sig: TAKE 1 TABLET(5 MG) BY MOUTH THREE TIMES DAILY     Last refill date:  05/09/2023  Qty: 90  Dx:   Last office visit: 05/09/2023   When is follow up due: 11/09/2023     For hormone prescriptions, date of last blood test for rx being requested:    Future Appointments   Date Time Provider Kirill Correa   6/30/2023  9:00 AM Karl Jones MD [facility] HND [facility]   11/21/2023  4:30 PM Darien Jain DO [facility] INT MED [facility]

## 2023-06-16 RX ORDER — BUSPIRONE HYDROCHLORIDE 5 MG/1
TABLET ORAL
Qty: 90 TABLET | Refills: 0 | Status: SHIPPED | OUTPATIENT
Start: 2023-06-16

## 2023-06-22 DIAGNOSIS — I10 ESSENTIAL HYPERTENSION: ICD-10-CM

## 2023-06-23 RX ORDER — LOSARTAN POTASSIUM 25 MG/1
TABLET ORAL
Qty: 90 TABLET | Refills: 0 | Status: SHIPPED | OUTPATIENT
Start: 2023-06-23

## 2023-06-26 ENCOUNTER — TELEPHONE (OUTPATIENT)
Dept: INTEGRATIVE MEDICINE | Facility: CLINIC | Age: 63
End: 2023-06-26

## 2023-06-26 RX ORDER — SERTRALINE HYDROCHLORIDE 100 MG/1
100 TABLET, FILM COATED ORAL EVERY MORNING
Qty: 30 TABLET | Refills: 1 | Status: SHIPPED | OUTPATIENT
Start: 2023-06-26

## 2023-06-27 RX ORDER — SERTRALINE HYDROCHLORIDE 100 MG/1
100 TABLET, FILM COATED ORAL EVERY MORNING
Qty: 90 TABLET | Refills: 0 | OUTPATIENT
Start: 2023-06-27

## 2023-06-30 ENCOUNTER — LAB ENCOUNTER (OUTPATIENT)
Dept: LAB | Facility: REFERENCE LAB | Age: 63
End: 2023-06-30
Attending: FAMILY MEDICINE
Payer: COMMERCIAL

## 2023-06-30 ENCOUNTER — OFFICE VISIT (OUTPATIENT)
Dept: SURGERY | Facility: CLINIC | Age: 63
End: 2023-06-30
Payer: COMMERCIAL

## 2023-06-30 VITALS
BODY MASS INDEX: 26.63 KG/M2 | WEIGHT: 156 LBS | SYSTOLIC BLOOD PRESSURE: 122 MMHG | DIASTOLIC BLOOD PRESSURE: 78 MMHG | HEIGHT: 64 IN

## 2023-06-30 DIAGNOSIS — N89.8 VAGINAL DRYNESS: Primary | ICD-10-CM

## 2023-06-30 DIAGNOSIS — I10 ESSENTIAL HYPERTENSION: ICD-10-CM

## 2023-06-30 DIAGNOSIS — Z12.11 ENCOUNTER FOR SCREENING COLONOSCOPY: ICD-10-CM

## 2023-06-30 DIAGNOSIS — Z00.00 WELL ADULT EXAM: ICD-10-CM

## 2023-06-30 DIAGNOSIS — R73.03 PREDIABETES: ICD-10-CM

## 2023-06-30 DIAGNOSIS — M45.9 ANKYLOSING SPONDYLITIS, UNSPECIFIED SITE OF SPINE (HCC): ICD-10-CM

## 2023-06-30 DIAGNOSIS — Z15.89 HLA B27 (HLA B27 POSITIVE): ICD-10-CM

## 2023-06-30 LAB
ALBUMIN SERPL-MCNC: 3.9 G/DL (ref 3.4–5)
ALBUMIN/GLOB SERPL: 1.1 {RATIO} (ref 1–2)
ALP LIVER SERPL-CCNC: 48 U/L
ALT SERPL-CCNC: 24 U/L
ANION GAP SERPL CALC-SCNC: 5 MMOL/L (ref 0–18)
AST SERPL-CCNC: 21 U/L (ref 15–37)
BASOPHILS # BLD AUTO: 0.07 X10(3) UL (ref 0–0.2)
BASOPHILS NFR BLD AUTO: 1.4 %
BILIRUB SERPL-MCNC: 0.7 MG/DL (ref 0.1–2)
BUN BLD-MCNC: 13 MG/DL (ref 7–18)
BUN/CREAT SERPL: 14.6 (ref 10–20)
CALCIUM BLD-MCNC: 9.4 MG/DL (ref 8.5–10.1)
CHLORIDE SERPL-SCNC: 110 MMOL/L (ref 98–112)
CHOLEST SERPL-MCNC: 233 MG/DL (ref ?–200)
CO2 SERPL-SCNC: 26 MMOL/L (ref 21–32)
CREAT BLD-MCNC: 0.89 MG/DL
CRP SERPL-MCNC: <0.29 MG/DL (ref ?–0.3)
DEPRECATED RDW RBC AUTO: 40 FL (ref 35.1–46.3)
EOSINOPHIL # BLD AUTO: 0.69 X10(3) UL (ref 0–0.7)
EOSINOPHIL NFR BLD AUTO: 13.7 %
ERYTHROCYTE [DISTWIDTH] IN BLOOD BY AUTOMATED COUNT: 12.2 % (ref 11–15)
ERYTHROCYTE [SEDIMENTATION RATE] IN BLOOD: 16 MM/HR
EST. AVERAGE GLUCOSE BLD GHB EST-MCNC: 108 MG/DL (ref 68–126)
FASTING PATIENT LIPID ANSWER: YES
FASTING STATUS PATIENT QL REPORTED: YES
GFR SERPLBLD BASED ON 1.73 SQ M-ARVRAT: 74 ML/MIN/1.73M2 (ref 60–?)
GLOBULIN PLAS-MCNC: 3.6 G/DL (ref 2.8–4.4)
GLUCOSE BLD-MCNC: 92 MG/DL (ref 70–99)
HBA1C MFR BLD: 5.4 % (ref ?–5.7)
HCT VFR BLD AUTO: 41 %
HDLC SERPL-MCNC: 97 MG/DL (ref 40–59)
HGB BLD-MCNC: 13.6 G/DL
IMM GRANULOCYTES # BLD AUTO: 0.01 X10(3) UL (ref 0–1)
IMM GRANULOCYTES NFR BLD: 0.2 %
LDLC SERPL CALC-MCNC: 127 MG/DL (ref ?–100)
LYMPHOCYTES # BLD AUTO: 1.49 X10(3) UL (ref 1–4)
LYMPHOCYTES NFR BLD AUTO: 29.7 %
MCH RBC QN AUTO: 29.6 PG (ref 26–34)
MCHC RBC AUTO-ENTMCNC: 33.2 G/DL (ref 31–37)
MCV RBC AUTO: 89.3 FL
MONOCYTES # BLD AUTO: 0.55 X10(3) UL (ref 0.1–1)
MONOCYTES NFR BLD AUTO: 11 %
NEUTROPHILS # BLD AUTO: 2.21 X10 (3) UL (ref 1.5–7.7)
NEUTROPHILS # BLD AUTO: 2.21 X10(3) UL (ref 1.5–7.7)
NEUTROPHILS NFR BLD AUTO: 44 %
NONHDLC SERPL-MCNC: 136 MG/DL (ref ?–130)
OSMOLALITY SERPL CALC.SUM OF ELEC: 292 MOSM/KG (ref 275–295)
PLATELET # BLD AUTO: 249 10(3)UL (ref 150–450)
POTASSIUM SERPL-SCNC: 4 MMOL/L (ref 3.5–5.1)
PROT SERPL-MCNC: 7.5 G/DL (ref 6.4–8.2)
RBC # BLD AUTO: 4.59 X10(6)UL
SODIUM SERPL-SCNC: 141 MMOL/L (ref 136–145)
TRIGL SERPL-MCNC: 56 MG/DL (ref 30–149)
TSI SER-ACNC: 0.79 MIU/ML (ref 0.36–3.74)
VIT D+METAB SERPL-MCNC: 34.8 NG/ML (ref 30–100)
VLDLC SERPL CALC-MCNC: 10 MG/DL (ref 0–30)
WBC # BLD AUTO: 5 X10(3) UL (ref 4–11)

## 2023-06-30 PROCEDURE — 3008F BODY MASS INDEX DOCD: CPT | Performed by: OBSTETRICS & GYNECOLOGY

## 2023-06-30 PROCEDURE — 80053 COMPREHEN METABOLIC PANEL: CPT

## 2023-06-30 PROCEDURE — 99214 OFFICE O/P EST MOD 30 MIN: CPT | Performed by: OBSTETRICS & GYNECOLOGY

## 2023-06-30 PROCEDURE — 85025 COMPLETE CBC W/AUTO DIFF WBC: CPT

## 2023-06-30 PROCEDURE — 86140 C-REACTIVE PROTEIN: CPT

## 2023-06-30 PROCEDURE — 84443 ASSAY THYROID STIM HORMONE: CPT

## 2023-06-30 PROCEDURE — 82306 VITAMIN D 25 HYDROXY: CPT

## 2023-06-30 PROCEDURE — 36415 COLL VENOUS BLD VENIPUNCTURE: CPT

## 2023-06-30 PROCEDURE — 3074F SYST BP LT 130 MM HG: CPT | Performed by: OBSTETRICS & GYNECOLOGY

## 2023-06-30 PROCEDURE — 3078F DIAST BP <80 MM HG: CPT | Performed by: OBSTETRICS & GYNECOLOGY

## 2023-06-30 PROCEDURE — 83036 HEMOGLOBIN GLYCOSYLATED A1C: CPT

## 2023-06-30 PROCEDURE — 85652 RBC SED RATE AUTOMATED: CPT

## 2023-06-30 PROCEDURE — 80061 LIPID PANEL: CPT

## 2023-07-25 RX ORDER — BUSPIRONE HYDROCHLORIDE 5 MG/1
5 TABLET ORAL 3 TIMES DAILY
Qty: 90 TABLET | Refills: 0 | Status: SHIPPED | OUTPATIENT
Start: 2023-07-25

## 2023-07-31 ENCOUNTER — HOSPITAL ENCOUNTER (OUTPATIENT)
Dept: MAMMOGRAPHY | Facility: HOSPITAL | Age: 63
Discharge: HOME | End: 2023-07-31
Attending: SURGERY
Payer: COMMERCIAL

## 2023-07-31 DIAGNOSIS — R92.1 MAMMOGRAPHIC CALCIFICATION: ICD-10-CM

## 2023-07-31 PROCEDURE — 77062 BREAST TOMOSYNTHESIS BI: CPT | Performed by: SURGERY

## 2023-07-31 PROCEDURE — 77066 DX MAMMO INCL CAD BI: CPT | Performed by: SURGERY

## 2023-08-17 ENCOUNTER — MED REC SCAN ONLY (OUTPATIENT)
Dept: INTEGRATIVE MEDICINE | Facility: CLINIC | Age: 63
End: 2023-08-17

## 2023-08-24 RX ORDER — SERTRALINE HYDROCHLORIDE 100 MG/1
100 TABLET, FILM COATED ORAL EVERY MORNING
Qty: 30 TABLET | Refills: 1 | Status: SHIPPED | OUTPATIENT
Start: 2023-08-24

## 2023-08-24 NOTE — TELEPHONE ENCOUNTER
Sertraline last ordered 6/26 #30 R-1    Please review and advise    Recent Appointments    Recent Outpatient Visits              1 month ago Vaginal dryness    [facility], [address] Shaun Guaman MD    Office Visit    3 months ago Well adult exam    [address] [place],[address], [address], [address], [place]    Office Visit    10 months ago [address], [address], [address] [place],     Office Visit    1 year ago [address], [address], [address], APRN    Telemedicine    1 year ago Mucoid cyst of joint    [address] [facility] [address] [facility] [address], Ana Foy., DPM    Office Visit

## 2023-09-23 DIAGNOSIS — I10 ESSENTIAL HYPERTENSION: ICD-10-CM

## 2023-09-27 DIAGNOSIS — I10 ESSENTIAL HYPERTENSION: ICD-10-CM

## 2023-09-28 RX ORDER — LOSARTAN POTASSIUM 25 MG/1
25 TABLET ORAL DAILY
Qty: 90 TABLET | Refills: 0 | Status: SHIPPED | OUTPATIENT
Start: 2023-09-28

## 2023-09-28 RX ORDER — LOSARTAN POTASSIUM 25 MG/1
25 TABLET ORAL DAILY
Qty: 90 TABLET | Refills: 0 | OUTPATIENT
Start: 2023-09-28

## 2023-10-23 RX ORDER — SERTRALINE HYDROCHLORIDE 100 MG/1
100 TABLET, FILM COATED ORAL EVERY MORNING
Qty: 90 TABLET | Refills: 0 | Status: SHIPPED | OUTPATIENT
Start: 2023-10-23

## 2023-11-09 ENCOUNTER — HOSPITAL ENCOUNTER (OUTPATIENT)
Age: 63
Discharge: HOME | End: 2023-11-09
Payer: COMMERCIAL

## 2023-11-09 VITALS
BODY MASS INDEX: 27.31 KG/M2 | OXYGEN SATURATION: 98 % | DIASTOLIC BLOOD PRESSURE: 78 MMHG | SYSTOLIC BLOOD PRESSURE: 140 MMHG | HEART RATE: 85 BPM | WEIGHT: 160 LBS | TEMPERATURE: 99 F | HEIGHT: 64 IN | RESPIRATION RATE: 16 BRPM

## 2023-11-09 DIAGNOSIS — J06.9 VIRAL URI: Primary | ICD-10-CM

## 2023-11-09 DIAGNOSIS — J04.0 ACUTE LARYNGITIS: ICD-10-CM

## 2023-11-09 PROCEDURE — 99213 OFFICE O/P EST LOW 20 MIN: CPT | Performed by: PHYSICIAN ASSISTANT

## 2023-11-09 RX ORDER — PREDNISONE 20 MG/1
40 TABLET ORAL DAILY
Qty: 10 TABLET | Refills: 0 | Status: SHIPPED | OUTPATIENT
Start: 2023-11-09 | End: 2023-11-14

## 2023-11-09 RX ORDER — BENZONATATE 100 MG/1
100 CAPSULE ORAL 3 TIMES DAILY PRN
Qty: 15 CAPSULE | Refills: 0 | Status: SHIPPED | OUTPATIENT
Start: 2023-11-09

## 2023-11-13 ENCOUNTER — TELEPHONE (OUTPATIENT)
Dept: FAMILY MEDICINE CLINIC | Facility: CLINIC | Age: 63
End: 2023-11-13

## 2023-11-13 ENCOUNTER — NURSE TRIAGE (OUTPATIENT)
Dept: FAMILY MEDICINE CLINIC | Facility: CLINIC | Age: 63
End: 2023-11-13

## 2023-11-13 NOTE — TELEPHONE ENCOUNTER
S/w Catina Hayden stated negative covid-19 3 days ago is completing 5 day burst 40 mg prednsione. Pt has constant coughing that has become productive yellow mucous. denies shortness of breath. Pt notified if have chest tightness or SOB to proceed to the ED. Pt voiced understanding. No same day appointment with any of the providers. After consult with Dr. Myles King is advised to schedule with Miriam QUAN or Awilda QUAN. Reason for Disposition   Known COPD or other severe lung disease (i.e., bronchiectasis, cystic fibrosis, lung surgery) and worsening symptoms (i.e., increased sputum purulence or amount, increased breathing difficulty)    Answer Assessment - Initial Assessment Questions  1. ONSET: \"When did the cough begin? \"       1 week  2. SEVERITY: \"How bad is the cough today? \"       Constant  3. SPUTUM: \"Describe the color of your sputum\" (none, dry cough; clear, white, yellow, green)      Yellow   4. HEMOPTYSIS: \"Are you coughing up any blood? \" If so ask: \"How much? \" (flecks, streaks, tablespoons, etc.)      Denied  5. DIFFICULTY BREATHING: Graciella Denver you having difficulty breathing? \" If Yes, ask: \"How bad is it? \" (e.g., mild, moderate, severe)     - MILD: No SOB at rest, mild SOB with walking, speaks normally in sentences, can lie down, no retractions, pulse < 100.     - MODERATE: SOB at rest, SOB with minimal exertion and prefers to sit, cannot lie down flat, speaks in phrases, mild retractions, audible wheezing, pulse 100-120.     - SEVERE: Very SOB at rest, speaks in single words, struggling to breathe, sitting hunched forward, retractions, pulse > 120       Denied  6. FEVER: \"Do you have a fever? \" If Yes, ask: \"What is your temperature, how was it measured, and when did it start? \"      Denied  7. CARDIAC HISTORY: \"Do you have any history of heart disease? \" (e.g., heart attack, congestive heart failure)       Denied  8. LUNG HISTORY: \"Do you have any history of lung disease? \"  (e.g., pulmonary embolus, asthma, emphysema)      Asthma  albuterol inhaler 3 times  9. PE RISK FACTORS: \"Do you have a history of blood clots? \" (or: recent major surgery, recent prolonged travel, bedridden)      PE   10. OTHER SYMPTOMS: \"Do you have any other symptoms? \" (e.g., runny nose, wheezing, chest pain)        Denied wheezing or shortness of breath. 11. PREGNANCY: \"Is there any chance you are pregnant? \" \"When was your last menstrual period? \"        N/A  12. TRAVEL: \"Have you traveled out of the country in the last month? \" (e.g., travel history, exposures)        Denied    Protocols used: PEIRM-U-QP

## 2023-11-13 NOTE — TELEPHONE ENCOUNTER
Consulted with Supervisor who Ok'd 30 min appt slot for acute illness with Ruby QUAN for 11/14/23. S/w Iram Buchanan and advised of Dr. Sade Fofana tp be evaluated with Ruby QUAN or Milagros QUAN. Pt agreed to schedule with Ruby QUAN on 11/14/23 at 3 PM.  Pt connected to Ruby QUAN's .

## 2023-11-14 ENCOUNTER — OFFICE VISIT (OUTPATIENT)
Dept: INTEGRATIVE MEDICINE | Facility: CLINIC | Age: 63
End: 2023-11-14
Payer: COMMERCIAL

## 2023-11-14 VITALS
HEIGHT: 64 IN | BODY MASS INDEX: 26.74 KG/M2 | DIASTOLIC BLOOD PRESSURE: 80 MMHG | SYSTOLIC BLOOD PRESSURE: 122 MMHG | WEIGHT: 156.63 LBS | HEART RATE: 79 BPM | OXYGEN SATURATION: 97 %

## 2023-11-14 DIAGNOSIS — R09.81 CONGESTION OF NASAL SINUS: ICD-10-CM

## 2023-11-14 DIAGNOSIS — U07.1 COVID-19: ICD-10-CM

## 2023-11-14 DIAGNOSIS — J06.0 SORE THROAT AND LARYNGITIS: ICD-10-CM

## 2023-11-14 DIAGNOSIS — R05.1 ACUTE COUGH: Primary | ICD-10-CM

## 2023-11-14 PROCEDURE — 3074F SYST BP LT 130 MM HG: CPT | Performed by: PHYSICIAN ASSISTANT

## 2023-11-14 PROCEDURE — 3079F DIAST BP 80-89 MM HG: CPT | Performed by: PHYSICIAN ASSISTANT

## 2023-11-14 PROCEDURE — 3008F BODY MASS INDEX DOCD: CPT | Performed by: PHYSICIAN ASSISTANT

## 2023-11-14 PROCEDURE — 99213 OFFICE O/P EST LOW 20 MIN: CPT | Performed by: PHYSICIAN ASSISTANT

## 2023-11-14 RX ORDER — AZELASTINE HYDROCHLORIDE 137 UG/1
SPRAY, METERED NASAL
COMMUNITY
Start: 2023-08-16

## 2023-11-14 RX ORDER — FLUTICASONE FUROATE AND VILANTEROL TRIFENATATE 100; 25 UG/1; UG/1
POWDER RESPIRATORY (INHALATION)
COMMUNITY
Start: 2023-06-01

## 2023-11-20 ENCOUNTER — NURSE TRIAGE (OUTPATIENT)
Dept: FAMILY MEDICINE CLINIC | Facility: CLINIC | Age: 63
End: 2023-11-20

## 2023-11-20 ENCOUNTER — PATIENT MESSAGE (OUTPATIENT)
Dept: INTEGRATIVE MEDICINE | Facility: CLINIC | Age: 63
End: 2023-11-20

## 2023-11-20 NOTE — TELEPHONE ENCOUNTER
S/w Chapincito Bhakta who diagnosed with covid on 11/14/23 with constant coughing. Coughing started on 11/7/23. Pt using Albuterol inhaler TID. Pt denied chest discomfort. Pulse oximetry 97%. Pt stated has increased coughing with exertion but no shortness of breath. Scheduled virtual visit with Brittani QUAN 11/20/23 at 2 PM.     Pt  informed if needs to use albuterol  inhaler more often than prescribed or develop, chest tightness, wheezing or moderate-severe shortness of breath to go directed to the ED. Pt voiced understanding. Reason for Disposition   Continuous (nonstop) coughing interferes with work or school and no improvement using cough treatment per Care Advice    Answer Assessment - Initial Assessment Questions  1. ONSET: \"When did the cough begin? \"       11/7/23  2. SEVERITY: \"How bad is the cough today? \"       Constant  3. SPUTUM: \"Describe the color of your sputum\" (none, dry cough; clear, white, yellow, green)      Clear  4. HEMOPTYSIS: \"Are you coughing up any blood? \" If so ask: \"How much? \" (flecks, streaks, tablespoons, etc.)      Denied  5. DIFFICULTY BREATHING: Leanne Beatriz you having difficulty breathing? \" If Yes, ask: \"How bad is it? \" (e.g., mild, moderate, severe)     - MILD: No SOB at rest, mild SOB with walking, speaks normally in sentences, can lie down, no retractions, pulse < 100.     - MODERATE: SOB at rest, SOB with minimal exertion and prefers to sit, cannot lie down flat, speaks in phrases, mild retractions, audible wheezing, pulse 100-120.     - SEVERE: Very SOB at rest, speaks in single words, struggling to breathe, sitting hunched forward, retractions, pulse > 120       Denied  6. FEVER: \"Do you have a fever? \" If Yes, ask: \"What is your temperature, how was it measured, and when did it start? \"      Denied  7. CARDIAC HISTORY: \"Do you have any history of heart disease? \" (e.g., heart attack, congestive heart failure)       Denied  8. LUNG HISTORY: \"Do you have any history of lung disease? \" (e.g., pulmonary embolus, asthma, emphysema)      Asthma  9. PE RISK FACTORS: \"Do you have a history of blood clots? \" (or: recent major surgery, recent prolonged travel, bedridden)      Hx PE BCP 2006. Now taking ASA 81 mg. 10. OTHER SYMPTOMS: \"Do you have any other symptoms? \" (e.g., runny nose, wheezing, chest pain)        Denied  11. PREGNANCY: \"Is there any chance you are pregnant? \" \"When was your last menstrual period? \"        N/A  12. TRAVEL: \"Have you traveled out of the country in the last month? \" (e.g., travel history, exposures)        Denied.     Protocols used: Cough-A-OH

## 2023-11-20 NOTE — TELEPHONE ENCOUNTER
From: Avanell Mohs  To: Ya Gonzalez  Sent: 11/20/2023 11:23 AM CST  Subject: Cough meds    I am taking the pearls 3x a day. Doing the nasal spray and taking sinatrol and my inhaler. Coughing is still an issue   Can I try cough medicine ( like mucinex or robitussin) with the benzonatate ?  Thanks

## 2023-11-21 ENCOUNTER — TELEMEDICINE (OUTPATIENT)
Dept: INTEGRATIVE MEDICINE | Facility: CLINIC | Age: 63
End: 2023-11-21
Payer: COMMERCIAL

## 2023-11-21 DIAGNOSIS — J00 ACUTE RHINITIS: ICD-10-CM

## 2023-11-21 DIAGNOSIS — U07.1 COVID-19: ICD-10-CM

## 2023-11-21 DIAGNOSIS — R05.1 ACUTE COUGH: Primary | ICD-10-CM

## 2023-11-21 NOTE — PROGRESS NOTES
Dana Morris is a 65year old female. Chief Complaint   Patient presents with    Follow - Up     Covid with improvement ( inquiring about cough medicine)        HPI:   Telemed visit:    Doing better with COVID (x1wk), but still with persistent cough and rhinitis. Feels as if it is stemming from throat, does not notice any PND. Triggered by talking or walking upstairs. Denies chest pain or chest pressure. Did not get a fever.  with COVID as well. Sleeping ok, cough not waking her. Still taking the Sinatrol, baby ASA. Blood pressure has tom WNL, unless she's coughing a lot. REVIEW OF SYSTEMS:   Review of Systems   Constitutional: Negative. Negative for chills, fatigue and fever. HENT:  Positive for rhinorrhea. Negative for congestion and trouble swallowing. Eyes: Negative. Negative for visual disturbance. Respiratory: Negative. Negative for cough, chest tightness, shortness of breath and wheezing. Cardiovascular: Negative. Negative for chest pain. Allergic/Immunologic: Negative. Neurological: Negative. Negative for weakness and headaches. Hematological: Negative. Psychiatric/Behavioral: Negative. Negative for sleep disturbance.              FAMILY HISTORY:      Family History   Problem Relation Age of Onset    Breast Cancer Other     Other (hypothyroidism) Sister     Other (MS) Sister     Other (arhtritis) Mother     Hypertension Mother     Other (prostate ca) Father     Other (htn) Father     Other (MI) Father     Cancer Father         Prostrate    Hypertension Father     Stroke Other     Other (chronic migraines) Daughter     Breast Cancer Paternal Aunt 76       MEDICAL HISTORY:     Past Medical History:   Diagnosis Date    Amenorrhea 2006    Post menopause    Anxiety     Atypical squamous cell changes of undetermined significance favor benign (ASCUS favor benign) 06/01/2002    Cerebral ventriculomegaly 01/26/2012    yearly neurologist Dr. Ashley Barboza at [facility] Depression     Dyspareunia     Eczema     Extrinsic asthma, unspecified     Hematochezia     Hemorrhoids     HLA B27 (HLA B27 positive) 12/22/2016    Hypertension     Nipple discharge     left, bloody skin fissure    PE (pulmonary embolism) 08/01/2006    in the lungs - 5 years ago, attributed to [facility]    Radiculopathy     of lumbosacral spine, cervical spine    Spondylitis, ankylosing ([facility] [address] 09/13/2016    subclinical hyperthyroidism     normalized    Vitamin B 12 deficiency     oral replacement       CURRENT MEDICATIONS:     Current Outpatient Medications   Medication Sig Dispense Refill    Azelastine HCl 137 MCG/SPRAY Nasal Solution       BREO ELLIPTA 100-25 MCG/ACT Inhalation Aerosol Powder, Breath Activated       benzonatate (TESSALON PERLES) 100 MG Oral Cap Take 1 capsule (100 mg total) by mouth 3 (three) times daily as needed for cough. (Patient not taking: Reported on 11/14/2023) 15 capsule 0    sertraline 100 MG Oral Tab Take 1 tablet (100 mg total) by mouth every morning. 90 tablet 0    losartan 25 MG Oral Tab Take 1 tablet (25 mg total) by mouth daily. 90 tablet 0    busPIRone 5 MG Oral Tab Take 1 tablet (5 mg total) by mouth 3 (three) times daily. 90 tablet 0    HUMIRA PEN 40 MG/0.4ML Subcutaneous Pen-injector Kit  (Patient not taking: Reported on 6/30/2023)      amoxicillin 500 MG Oral Cap  (Patient not taking: Reported on 11/9/2023)      Nicole Mueller 200-25 MCG/INH Inhalation Aerosol Powder, Breath Activated INHALE ONE PUFF EVERY DAY (Patient not taking: Reported on 11/14/2023)      B Complex Vitamins (VITAMIN B COMPLEX 100 IJ)       Nutritional Supplements (DHEA) 15-50 MG Oral Cap  (Patient not taking: Reported on 11/14/2023)      Fluticasone Furoate-Vilanterol (BREO ELLIPTA IN) Inhale into the lungs. (Patient not taking: Reported on 6/30/2023)      Adalimumab (HUMIRA SC) Inject into the skin. Meloxicam 15 MG Oral Tab Take 0.5-1 tablets (7.5-15 mg total) by mouth daily as needed.  (Patient not taking: Reported on 11/9/2023)      PROAIR  (90 BASE) MCG/ACT Inhalation Aero Soln  (Patient not taking: Reported on 11/9/2023)  3       SOCIAL HISTORY:   Lifestyle Factors affecting health:  Diet -   Exercise -   Stress -   Sleep -     Social History     Socioeconomic History    Marital status:    Occupational History    Occupation: teacher   Tobacco Use    Smoking status: Never     Passive exposure: Never    Smokeless tobacco: Never   Vaping Use    Vaping Use: Never used   Substance and Sexual Activity    Alcohol use: Yes     Comment: 2 drinks per week    Drug use: No    Sexual activity: Yes     Partners: Male     Comment: menopause   Other Topics Concern    Blood Transfusions No    Caffeine Concern No    Stress Concern No    Weight Concern Yes    Special Diet No    Exercise Yes    Seat Belt No   Social History Narrative    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 Orestes [facility]avarria at [facility]    Stereotactic breast biopsy  3/6/12 Orestes Chavarria [facility]    Left breast    Tonsillectomy      age 4       PHYSICAL EXAM:   There were no vitals filed for this visit. Physical Exam  Constitutional:       General: She is not in acute distress. Appearance: Normal appearance. She is not ill-appearing or toxic-appearing. HENT:      Head: Normocephalic and atraumatic. Eyes:      Extraocular Movements: Extraocular movements intact.    Pulmonary:      Effort: Pulmonary effort is normal. No respiratory distress. Musculoskeletal:      Cervical back: Normal range of motion. Skin:     Coloration: Skin is not jaundiced. Findings: No lesion. Neurological:      Mental Status: She is alert and oriented to person, place, and time. Psychiatric:         Mood and Affect: Mood normal.         Behavior: Behavior normal.         Thought Content: Thought content normal.         Judgment: Judgment normal.          ASSESSMENT AND PLAN:     [facility] Lab Encounter on 06/30/2023   Component Date Value Ref Range Status    Glucose 06/30/2023 92  70 - 99 mg/dL Final    Sodium 06/30/2023 141  136 - 145 mmol/L Final    Potassium 06/30/2023 4.0  3.5 - 5.1 mmol/L Final    Chloride 06/30/2023 110  98 - 112 mmol/L Final    CO2 06/30/2023 26.0  21.0 - 32.0 mmol/L Final    Anion Gap 06/30/2023 5  0 - 18 mmol/L Final    BUN 06/30/2023 13  7 - 18 mg/dL Final    Creatinine 06/30/2023 0.89  0.55 - 1.02 mg/dL Final    BUN/CREA Ratio 06/30/2023 14.6  10.0 - 20.0 Final    Calcium, Total 06/30/2023 9.4  8.5 - 10.1 mg/dL Final    Calculated Osmolality 06/30/2023 292  275 - 295 mOsm/kg Final    eGFR-Cr 06/30/2023 74  >=60 mL/min/1.73m2 Final    ALT 06/30/2023 24  13 - 56 U/L Final    AST 06/30/2023 21  15 - 37 U/L Final    Alkaline Phosphatase 06/30/2023 48 (L)  50 - 130 U/L Final    Bilirubin, Total 06/30/2023 0.7  0.1 - 2.0 mg/dL Final    Total Protein 06/30/2023 7.5  6.4 - 8.2 g/dL Final    Albumin 06/30/2023 3.9  3.4 - 5.0 g/dL Final    Globulin  06/30/2023 3.6  2.8 - 4.4 g/dL Final    A/G Ratio 06/30/2023 1.1  1.0 - 2.0 Final    Patient Fasting for CMP? 06/30/2023 Yes   Final    Cholesterol, Total 06/30/2023 233 (H)  <200 mg/dL Final    Desirable  <200 mg/dL  Borderline  200-239 mg/dL  High      >=240 mg/dL        HDL Cholesterol 06/30/2023 97 (H)  40 - 59 mg/dL Final    Interpretive Information:   An HDL cholesterol <40 mg/dL is low and constitutes a coronary heart disease risk factor.  An HDL cholesterol >60 mg/dL is a negative risk factor for coronary heart disease. Triglycerides 06/30/2023 56  30 - 149 mg/dL Final    Reference interval for fasting triglycerides  Desirable: <150 mg/dL  Borderline: 150-199 mg/dL  High: 200-499 mg/dL  Very High: >=500 mg/dL          LDL Cholesterol 06/30/2023 127 (H)  <100 mg/dL Final    Desirable <100 mg/dL   Borderline 100-129 mg/dL   High     >=130mg/dL        VLDL 06/30/2023 10  0 - 30 mg/dL Final    Non HDL Chol 06/30/2023 136 (H)  <130 mg/dL Final    Desirable  <130 mg/dL   Borderline  130-159 mg/dL   High        160-189 mg/dL       Very high >=190 mg/dL        Patient Fasting for Lipid? 06/30/2023 Yes   Final    Vitamin D, 25OH, Total 06/30/2023 34.8  30.0 - 100.0 ng/mL Final    Literature Recommendations for 25(OH)D levels are:  Range           Vitamin D Status   <20    ng/mL      Deficiency   20-<30 ng/mL      Insufficiency    ng/mL      Sufficiency   >100   ng/mL      Toxicity    *Clinical controversy exists regarding optimal 25(OH)D levels. Emerging evidence links potential adverse effects to high levels, particularly >60 ng/mL. HgbA1C 06/30/2023 5.4  <5.7 % Final     Normal HbA1C:     <5.7%      Pre-Diabetic:     5.7 - 6.4%      Diabetic:         >6.4%      Diabetic Control: <7.0%        Estimated Average Glucose 06/30/2023 108  68 - 126 mg/dL Final    eAG is the estimated average glucose calculated from Hgb A1c according to the formula recommended by the American Diabetes Association. eAG levels reflect the long term average glucose and may not correlate with random or fasting glucose levels since these represent specific points in time. TSH 06/30/2023 0.792  0.358 - 3.740 mIU/mL Final    This test may exhibit interference when a sample is collected from a person who is consuming high dose of biotin (a.k.a., vitamin B7, vitamin H, coenzyme R) supplements resulting in serum concentrations >100 ng/mL.   Intake of the recommended daily allowance (RDA) for biotin (0.03 mg) has not been shown to typically cause significant interference; however, high dose daily dietary supplements may contain biotin concentrations greater than 150 times (5-10 mg) the RDA. It is recommended that physicians ask all patients who may be on biotin supplementation to stop biotin consumption at least 72 hours prior to collection of a new sample. Sed Rate 06/30/2023 16  0 - 30 mm/Hr Final    C-Reactive Protein 06/30/2023 <0.29  <0.30 mg/dL Final    WBC 06/30/2023 5.0  4.0 - 11.0 x10(3) uL Final    RBC 06/30/2023 4.59  3.80 - 5.30 x10(6)uL Final    HGB 06/30/2023 13.6  12.0 - 16.0 g/dL Final    HCT 06/30/2023 41.0  35.0 - 48.0 % Final    MCV 06/30/2023 89.3  80.0 - 100.0 fL Final    MCH 06/30/2023 29.6  26.0 - 34.0 pg Final    MCHC 06/30/2023 33.2  31.0 - 37.0 g/dL Final    RDW-SD 06/30/2023 40.0  35.1 - 46.3 fL Final    RDW 06/30/2023 12.2  11.0 - 15.0 % Final    PLT 06/30/2023 249.0  150.0 - 450.0 10(3)uL Final    Neutrophil Absolute Prelim 06/30/2023 2.21  1.50 - 7.70 x10 (3) uL Final    Neutrophil Absolute 06/30/2023 2.21  1.50 - 7.70 x10(3) uL Final    Lymphocyte Absolute 06/30/2023 1.49  1.00 - 4.00 x10(3) uL Final    Monocyte Absolute 06/30/2023 0.55  0.10 - 1.00 x10(3) uL Final    Eosinophil Absolute 06/30/2023 0.69  0.00 - 0.70 x10(3) uL Final    Basophil Absolute 06/30/2023 0.07  0.00 - 0.20 x10(3) uL Final    Immature Granulocyte Absolute 06/30/2023 0.01  0.00 - 1.00 x10(3) uL Final    Neutrophil % 06/30/2023 44.0  % Final    Lymphocyte % 06/30/2023 29.7  % Final    Monocyte % 06/30/2023 11.0  % Final    Eosinophil % 06/30/2023 13.7  % Final    Basophil % 06/30/2023 1.4  % Final    Immature Granulocyte % 06/30/2023 0.2  % Final      No results found. 1. Acute cough    2. COVID-19    3. Acute rhinitis    This visit was conducted using Telemedicine with live, interactive video and audio.    The patient understands the risks and benefits of Telemedicine and that a Telemedicine visit limits the ability to perform a thorough physical examination which may affect objective findings related to specific symptoms and conditions which can, in turn, affect treatment. The patient was located in the state of [place] at the time of the encounter. Time spent with patient: Over 15 minutes spent in chart review and in direct communication with patient obtaining and reviewing history, creating a unique care plan, explaining the rationale for treatment, reviewing potential SE and overall treatment plan,  documenting all clinical information in Epic. Over 50% of this time was in education, counseling and coordination of care. Problem List Items Addressed This Visit    None  Visit Diagnoses       Acute cough    -  Primary    COVID-19        Acute rhinitis                 COVID, persistent cough. Denies any chest related symptoms. All other symptoms are improving at this time. Still difficulty with cough with any exertion or talking. Minimal relief if any from benzonatate. Discussed alternatives and agreed it would be okay for her to try Robitussin DM. Continue Sinatrol, baby aspirin until symptoms resolved. Should symptoms worsen, go to IC for further work-up. Given further recommendations as below    Orders Placed This Visit:  No orders of the defined types were placed in this encounter. No orders of the defined types were placed in this encounter. There are no Patient Instructions on file for this visit. No follow-ups on file. Patient affirmed understanding of plan and all questions were answered.      Kala Leblanc PA-C

## 2023-11-28 ENCOUNTER — APPOINTMENT (OUTPATIENT)
Dept: GENERAL RADIOLOGY | Age: 63
End: 2023-11-28
Attending: NURSE PRACTITIONER
Payer: COMMERCIAL

## 2023-11-28 ENCOUNTER — HOSPITAL ENCOUNTER (OUTPATIENT)
Age: 63
Discharge: HOME | End: 2023-11-28
Payer: COMMERCIAL

## 2023-11-28 VITALS
SYSTOLIC BLOOD PRESSURE: 154 MMHG | BODY MASS INDEX: 26.98 KG/M2 | OXYGEN SATURATION: 98 % | WEIGHT: 158 LBS | HEIGHT: 64 IN | DIASTOLIC BLOOD PRESSURE: 83 MMHG | HEART RATE: 88 BPM | RESPIRATION RATE: 22 BRPM | TEMPERATURE: 97 F

## 2023-11-28 DIAGNOSIS — R21 RASH AND NONSPECIFIC SKIN ERUPTION: ICD-10-CM

## 2023-11-28 DIAGNOSIS — R05.2 SUBACUTE COUGH: Primary | ICD-10-CM

## 2023-11-28 PROCEDURE — 71046 X-RAY EXAM CHEST 2 VIEWS: CPT | Performed by: NURSE PRACTITIONER

## 2023-11-28 PROCEDURE — 99213 OFFICE O/P EST LOW 20 MIN: CPT | Performed by: NURSE PRACTITIONER

## 2023-11-29 NOTE — DISCHARGE INSTRUCTIONS
Continue over-the-counter antihistamines as discussed. Robitussin is also helpful for coughing symptoms. Follow closely with your primary. abdominal pain

## 2023-12-22 ENCOUNTER — OFFICE VISIT (OUTPATIENT)
Dept: FAMILY MEDICINE CLINIC | Facility: CLINIC | Age: 63
End: 2023-12-22
Payer: COMMERCIAL

## 2023-12-22 VITALS
BODY MASS INDEX: 28 KG/M2 | HEART RATE: 78 BPM | OXYGEN SATURATION: 99 % | DIASTOLIC BLOOD PRESSURE: 80 MMHG | WEIGHT: 161.63 LBS | SYSTOLIC BLOOD PRESSURE: 138 MMHG

## 2023-12-22 DIAGNOSIS — R05.2 SUBACUTE COUGH: Primary | ICD-10-CM

## 2023-12-22 DIAGNOSIS — R07.0 THROAT DISCOMFORT: ICD-10-CM

## 2023-12-22 DIAGNOSIS — R09.82 PND (POST-NASAL DRIP): ICD-10-CM

## 2023-12-22 DIAGNOSIS — R59.0 CERVICAL LYMPHADENOPATHY: ICD-10-CM

## 2023-12-22 LAB
CONTROL LINE PRESENT WITH A CLEAR BACKGROUND (YES/NO): YES YES/NO
STREP GRP A CUL-SCR: NEGATIVE

## 2023-12-22 PROCEDURE — 99213 OFFICE O/P EST LOW 20 MIN: CPT | Performed by: STUDENT IN AN ORGANIZED HEALTH CARE EDUCATION/TRAINING PROGRAM

## 2023-12-22 PROCEDURE — 3075F SYST BP GE 130 - 139MM HG: CPT | Performed by: STUDENT IN AN ORGANIZED HEALTH CARE EDUCATION/TRAINING PROGRAM

## 2023-12-22 PROCEDURE — 3079F DIAST BP 80-89 MM HG: CPT | Performed by: STUDENT IN AN ORGANIZED HEALTH CARE EDUCATION/TRAINING PROGRAM

## 2023-12-22 PROCEDURE — 87081 CULTURE SCREEN ONLY: CPT | Performed by: STUDENT IN AN ORGANIZED HEALTH CARE EDUCATION/TRAINING PROGRAM

## 2023-12-22 PROCEDURE — 87880 STREP A ASSAY W/OPTIC: CPT | Performed by: STUDENT IN AN ORGANIZED HEALTH CARE EDUCATION/TRAINING PROGRAM

## 2023-12-22 RX ORDER — FLUTICASONE PROPIONATE 50 MCG
2 SPRAY, SUSPENSION (ML) NASAL DAILY
Qty: 1 EACH | Refills: 1 | Status: SHIPPED | OUTPATIENT
Start: 2023-12-22

## 2023-12-22 RX ORDER — FLUTICASONE PROPIONATE 50 MCG
2 SPRAY, SUSPENSION (ML) NASAL DAILY
Qty: 48 G | Refills: 0 | OUTPATIENT
Start: 2023-12-22

## 2023-12-29 DIAGNOSIS — I10 ESSENTIAL HYPERTENSION: ICD-10-CM

## 2023-12-29 RX ORDER — LOSARTAN POTASSIUM 25 MG/1
25 TABLET ORAL DAILY
Qty: 90 TABLET | Refills: 0 | Status: SHIPPED | OUTPATIENT
Start: 2023-12-29

## 2023-12-29 NOTE — TELEPHONE ENCOUNTER
A refill request was received for:  Requested Prescriptions     Pending Prescriptions Disp Refills    losartan 25 MG Oral Tab 90 tablet 0     Sig: Take 1 tablet (25 mg total) by mouth daily.      Last refill date:  9/28/23   Qty: 90 and 0   Dx: HTN

## 2024-01-22 RX ORDER — SERTRALINE HYDROCHLORIDE 100 MG/1
100 TABLET, FILM COATED ORAL EVERY MORNING
Qty: 90 TABLET | Refills: 0 | Status: SHIPPED | OUTPATIENT
Start: 2024-01-22

## 2024-01-22 NOTE — TELEPHONE ENCOUNTER
A refill request was received for:  Requested Prescriptions     Pending Prescriptions Disp Refills    sertraline 100 MG Oral Tab 90 tablet 0     Sig: Take 1 tablet (100 mg total) by mouth every morning.     Last refill date:  10/23/23   Qty: 90 and 0   Dx: anxiety   Last office visit:  12/22/23     For hormone prescriptions, date of last blood test for rx being requested:    No future appointments.

## 2024-03-14 ENCOUNTER — NURSE TRIAGE (OUTPATIENT)
Dept: FAMILY MEDICINE CLINIC | Facility: CLINIC | Age: 64
End: 2024-03-14

## 2024-03-14 ENCOUNTER — TELEMEDICINE (OUTPATIENT)
Dept: FAMILY MEDICINE CLINIC | Facility: CLINIC | Age: 64
End: 2024-03-14
Payer: COMMERCIAL

## 2024-03-14 DIAGNOSIS — U07.1 COVID-19: Primary | ICD-10-CM

## 2024-03-14 PROCEDURE — 99214 OFFICE O/P EST MOD 30 MIN: CPT | Performed by: STUDENT IN AN ORGANIZED HEALTH CARE EDUCATION/TRAINING PROGRAM

## 2024-03-14 NOTE — TELEPHONE ENCOUNTER
RN s/w patient.     Patient + for covid yesterday- started with fatigue. Pt has fatigue, cough, and congestion.    Pt on rinvoq.     Pt scheduled to d/w Dr. Bishop today. ED precautions discussed with patient in detail.

## 2024-03-14 NOTE — TELEPHONE ENCOUNTER
Patient said she just tested positive for covid. She wants to be put on meds. I offered a video visit with Dr. Bishop, she refused and asked to speak to a nurse.

## 2024-03-14 NOTE — PROGRESS NOTES
This is a telemedicine visit with live, interactive video and audio.     Patient understands and accepts financial responsibility for any deductible, co-insurance and/or co-pays associated with this service.    SUBJECTIVE    63-year-old female tested COVID-positive.  Patient has been in close proximity with her daughter that started feeling sick and tested COVID-positive over the weekend.  Patient tested herself at home on Monday and Tuesday with negative results however yesterday on Wednesday, felt fatigued and tested COVID positive.  She was recently also COVID-positive in November.  At this point symptoms only include fatigue and mild cough.  No fever, chills, SOB or difficulty breathing.  On Rinvoq for ankylosing spondylitis.  Took Paxlovid 1 year ago with no side effects.    HISTORY:  Past Medical History:   Diagnosis Date    Amenorrhea 2006    Post menopause    Anxiety     Atypical squamous cell changes of undetermined significance favor benign (ASCUS favor benign) 06/01/2002    Cerebral ventriculomegaly 01/26/2012    yearly neurologist Dr. Savage at [facility]    Depression     Dyspareunia     Eczema     Extrinsic asthma, unspecified     Hematochezia     Hemorrhoids     HLA B27 (HLA B27 positive) 12/22/2016    Hypertension     Nipple discharge     left, bloody skin fissure    PE (pulmonary embolism) 08/01/2006    in the lungs - 5 years ago, attributed to [facility]    Radiculopathy     of lumbosacral spine, cervical spine    Spondylitis, ankylosing (HCC) 09/13/2016    subclinical hyperthyroidism     normalized    Vitamin B 12 deficiency     oral replacement      Past Surgical History:   Procedure Laterality Date    COLONOSCOPY,BIOPSY  07/2011    COLPOSCOPY, CERVIX W/UPPER ADJACENT VAGINA; W/BIOPSY(S), CERVIX  2000    HIP REPLACEMENT SURGERY  02/2014    Left Hip    KNEE ARTHROSCOPY      age 21 right knee    LEEP  2000    DION BIOPSY STEREO NODULE 1 SITE LEFT (CPT=19081)  2012    benign    OTHER SURGICAL HISTORY   09/26/2012    Excision of lipomatous mass - left arm performed by Dr. Terrell at [facility]    STEREOTACTIC BREAST BIOPSY  3/6/12 X[facility]    Left breast    TONSILLECTOMY      age 5      Family History   Problem Relation Age of Onset    Breast Cancer Other     Other (hypothyroidism) Sister     Other (MS) Sister     Other (arhtritis) Mother     Hypertension Mother     Other (prostate ca) Father     Other (htn) Father     Other (MI) Father     Cancer Father         Prostrate    Hypertension Father     Stroke Other     Other (chronic migraines) Daughter     Breast Cancer Paternal Aunt 75      Social History     Socioeconomic History    Marital status:    Occupational History    Occupation: teacher   Tobacco Use    Smoking status: Never     Passive exposure: Never    Smokeless tobacco: Never   Vaping Use    Vaping Use: Never used   Substance and Sexual Activity    Alcohol use: Yes     Comment: 2 drinks per week    Drug use: No    Sexual activity: Yes     Partners: Male     Comment: menopause   Other Topics Concern    Blood Transfusions No    Caffeine Concern No    Stress Concern No    Weight Concern Yes    Special Diet No    Exercise Yes    Seat Belt No   Social History Narrative    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 - aged 21 and 24 yo (July 2019)     -  diagnosed with early-onset Alzheimer's (7/2021)        Allergies   Allergen Reactions    Dust Mite Extract OTHER (SEE COMMENTS)     And dust mites    And dust mites   And dust mites    Levonorgestrel-Ethinyl Estrad OTHER (SEE COMMENTS)     Other reaction(s): Runny nose    Other reaction(s): Runny nose   Other reaction(s): OTHER (SEE COMMENTS)   Other reaction(s): Runny nose    Other WHEEZING    Codeine NAUSEA AND VOMITING    Bactrim      Rash    Cat Dander [Dander]     Dust      And dust mites    Seasonal Runny nose    Sulfa Antibiotics RASH    Sulfamethoxazole W/Trimethoprim RASH     Rash    Rash   Rash      Rash   Rash   Rash    Rash   Rash   Rash      Rash   Rash      Current Outpatient Medications   Medication Sig Dispense Refill    nirmatrelvir-ritonavir 150-100 MG Oral Tablet Therapy Pack Take one nirmatrelvir tablet (150mg) with one ritonavir tablet (100mg) together twice daily for 5 days. 20 tablet 0    sertraline 100 MG Oral Tab Take 1 tablet (100 mg total) by mouth every morning. 90 tablet 0    losartan 25 MG Oral Tab Take 1 tablet (25 mg total) by mouth daily. 90 tablet 0    Upadacitinib ER 15 MG Oral Tablet 24 Hr Take 15 mg by mouth daily.      Azelastine HCl 137 MCG/SPRAY Nasal Solution       BREO ELLIPTA 100-25 MCG/ACT Inhalation Aerosol Powder, Breath Activated Inhale 1 puff into the lungs daily.      busPIRone 5 MG Oral Tab Take 1 tablet (5 mg total) by mouth 3 (three) times daily. 90 tablet 0    Meloxicam 15 MG Oral Tab Take 0.5-1 tablets (7.5-15 mg total) by mouth daily as needed.      fluticasone propionate 50 MCG/ACT Nasal Suspension 2 sprays by Each Nare route daily. 1 each 1    PROAIR  (90 BASE) MCG/ACT Inhalation Aero Soln   3       OBJECTIVE  Physical Exam:   alert, appears stated age, cooperative, and no distress, Speaking in full sentences comfortably, and Normal work of breathing    ASSESSMENT & PLAN    1. COVID-19 (Primary)  Labs from 2/17/2024 shows EGFR of 57.    -Symptomatic treatment includes antipyretics and analgesics for fever, myalgias, and headaches. We generally prefer acetaminophen; however NSAID use is acceptable if symptoms do not respond to acetaminophen.  -Stay home for at least 5 days and isolate from others in your home. You are likely most infectious during these first 5 days.  -Wear a high quality mask if you must be around others at home and in public.  -Do not go places where you are unable to wear a mask.  -Do not travel  -Stay home and separate from others as much as possible.  -Use a separate bathroom, if possible.  -Take steps to improve ventilation at home, if possible.  -Don't  share personal household items, like cups, towels, and utensils.  -Monitor your symptoms. If you have an emergency warning sign (like trouble breathing), seek emergency medical care immediately.  -Medication risks and side effects discussed.  -Hold Upadacitinib while on treatment and discuss with rheumatology  -Reduce buspirone to 2.5 mg daily  -     Nirmatrelvir&Ritonavir 150/100; Take one nirmatrelvir tablet (150mg) with one ritonavir tablet (100mg) together twice daily for 5 days.  Dispense: 20 tablet; Refill: 0  -     Comp Metabolic Panel (14); Future; Expected date: 03/14/2024    Discussed lab results done through [facility].      Return if symptoms worsen or fail to improve.    Matthew Bishop MD

## 2024-03-21 DIAGNOSIS — I10 ESSENTIAL HYPERTENSION: ICD-10-CM

## 2024-04-01 RX ORDER — LOSARTAN POTASSIUM 25 MG/1
25 TABLET ORAL DAILY
Qty: 90 TABLET | Refills: 0 | Status: SHIPPED | OUTPATIENT
Start: 2024-04-01

## 2024-04-22 ENCOUNTER — TELEPHONE (OUTPATIENT)
Dept: FAMILY MEDICINE CLINIC | Facility: CLINIC | Age: 64
End: 2024-04-22

## 2024-04-22 RX ORDER — SERTRALINE HYDROCHLORIDE 100 MG/1
100 TABLET, FILM COATED ORAL EVERY MORNING
Qty: 90 TABLET | Refills: 0 | OUTPATIENT
Start: 2024-04-22

## 2024-04-22 NOTE — TELEPHONE ENCOUNTER
A refill request was received for:  Requested Prescriptions     Pending Prescriptions Disp Refills    SERTRALINE 100 MG Oral Tab [Pharmacy Med Name: SERTRALINE 100MG TABLETS] 90 tablet 0     Sig: TAKE 1 TABLET(100 MG) BY MOUTH EVERY MORNING     Last refill date:  01/22/2024  Qty: 90 - No refills  Dx: Anxiety  Last office visit: 08/03/2022  When is follow up due: DUE    No future appointments.

## 2024-04-22 NOTE — TELEPHONE ENCOUNTER
I have not seen patient since 2022. It looks like she saw Dr. Bishop for an acute issue more recently, but typically sees Malka.

## 2024-06-14 ENCOUNTER — TELEMEDICINE (OUTPATIENT)
Dept: FAMILY MEDICINE CLINIC | Facility: CLINIC | Age: 64
End: 2024-06-14
Payer: COMMERCIAL

## 2024-06-14 DIAGNOSIS — Z00.00 ROUTINE MEDICAL EXAM: ICD-10-CM

## 2024-06-14 DIAGNOSIS — E78.00 HYPERCHOLESTEROLEMIA: ICD-10-CM

## 2024-06-14 DIAGNOSIS — I10 ESSENTIAL HYPERTENSION: Primary | ICD-10-CM

## 2024-06-14 DIAGNOSIS — F41.9 ANXIETY: ICD-10-CM

## 2024-06-14 PROCEDURE — 99214 OFFICE O/P EST MOD 30 MIN: CPT | Performed by: STUDENT IN AN ORGANIZED HEALTH CARE EDUCATION/TRAINING PROGRAM

## 2024-06-14 RX ORDER — SERTRALINE HYDROCHLORIDE 100 MG/1
100 TABLET, FILM COATED ORAL EVERY MORNING
Qty: 90 TABLET | Refills: 0 | Status: SHIPPED | OUTPATIENT
Start: 2024-06-14

## 2024-06-14 RX ORDER — LOSARTAN POTASSIUM 25 MG/1
25 TABLET ORAL DAILY
Qty: 90 TABLET | Refills: 0 | Status: SHIPPED | OUTPATIENT
Start: 2024-06-14

## 2024-06-14 NOTE — PROGRESS NOTES
This is a telemedicine visit with live, interactive video and audio.     Patient understands and accepts financial responsibility for any deductible, co-insurance and/or co-pays associated with this service.    SUBJECTIVE    63-year-old female calling to follow-up for hypertension and anxiety.  Needs refill for sertraline and losartan.  Compliant with medication.  Has not been checking her blood pressure at home however has a BP machine and will start doing so.  Also mentions that now she has been working out by doing cardio and weights.  History of anxiety, stable.  Recently retired and states she would eventually like to wean off of medication.  No SHIP.  Is also on buspirone however states takes it only as needed.  Historically tried Wellbutrin but did not tolerate well.    HISTORY:  Past Medical History:    Amenorrhea    Post menopause    Anxiety    Atypical squamous cell changes of undetermined significance favor benign (ASCUS favor benign)    Cerebral ventriculomegaly    yearly neurologist Dr. Savage at [facility]    Dyspareunia    Eczema    Extrinsic asthma, unspecified    Hematochezia    Hemorrhoids    HLA B27 (HLA B27 positive)    Hypertension    Nipple discharge    left, bloody skin fissure    PE (pulmonary embolism)    in the lungs - 5 years ago, attributed to BC    Radiculopathy    of lumbosacral spine, cervical spine    Spondylitis, ankylosing (HCC)    subclinical hyperthyroidism    normalized    Vitamin B 12 deficiency    oral replacement      Past Surgical History:   Procedure Laterality Date    Colonoscopy,biopsy  07/2011    Colposcopy, cervix w/upper adjacent vagina; w/biopsy(s), cervix  2000    Hip replacement surgery  02/2014    Left Hip    Knee arthroscopy      age 21 right knee    Leep  2000    Steven biopsy stereo nodule 1 site left (cpt=19081)  2012    benign    Other surgical history  09/26/2012    Excision of lipomatous mass - left arm performed by Dr. Terrell at [facility]     Stereotactic breast biopsy  3/6/12 [facility]    Left breast    Tonsillectomy      age 5      Family History   Problem Relation Age of Onset    Breast Cancer Other     Other (hypothyroidism) Sister     Other (MS) Sister     Other (arhtritis) Mother     Hypertension Mother     Other (prostate ca) Father     Other (htn) Father     Other (MI) Father     Cancer Father         Prostrate    Hypertension Father     Stroke Other     Other (chronic migraines) Daughter     Breast Cancer Paternal Aunt 75      Social History     Socioeconomic History    Marital status:    Occupational History    Occupation: teacher   Tobacco Use    Smoking status: Never     Passive exposure: Never    Smokeless tobacco: Never   Vaping Use    Vaping status: Never Used   Substance and Sexual Activity    Alcohol use: Yes     Comment: 2 drinks per week    Drug use: No    Sexual activity: Yes     Partners: Male     Comment: menopause   Other Topics Concern    Blood Transfusions No    Caffeine Concern No    Stress Concern No    Weight Concern Yes    Special Diet No    Exercise Yes    Seat Belt No   Social History Narrative    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.    2 children - aged 21 and 22 yo (July 2019)     -  diagnosed with early-onset Alzheimer's (7/2021)        Allergies   Allergen Reactions    Dust Mite Extract OTHER (SEE COMMENTS)     And dust mites    And dust mites   And dust mites    Levonorgestrel-Ethinyl Estrad OTHER (SEE COMMENTS)     Other reaction(s): Runny nose    Other reaction(s): Runny nose   Other reaction(s): OTHER (SEE COMMENTS)   Other reaction(s): Runny nose    Other WHEEZING    Codeine NAUSEA AND VOMITING    Bactrim      Rash    Cat Dander [Dander]     Dust      And dust mites    Seasonal Runny nose    Sulfa Antibiotics RASH    Sulfamethoxazole W/Trimethoprim RASH     Rash    Rash   Rash      Rash   Rash   Rash   Rash   Rash   Rash      Rash   Rash      Current Outpatient Medications   Medication Sig Dispense  Refill    losartan 25 MG Oral Tab Take 1 tablet (25 mg total) by mouth daily. 90 tablet 0    sertraline 100 MG Oral Tab Take 1 tablet (100 mg total) by mouth every morning. 90 tablet 0    Upadacitinib ER 15 MG Oral Tablet 24 Hr Take 15 mg by mouth daily.      BREO ELLIPTA 100-25 MCG/ACT Inhalation Aerosol Powder, Breath Activated Inhale 1 puff into the lungs daily.      busPIRone 5 MG Oral Tab Take 1 tablet (5 mg total) by mouth 3 (three) times daily. 90 tablet 0    PROAIR  (90 BASE) MCG/ACT Inhalation Aero Soln   3    fluticasone propionate 50 MCG/ACT Nasal Suspension 2 sprays by Each Nare route daily. 1 each 1    Azelastine HCl 137 MCG/SPRAY Nasal Solution       Meloxicam 15 MG Oral Tab Take 0.5-1 tablets (7.5-15 mg total) by mouth daily as needed.         OBJECTIVE  Physical Exam:   alert, appears stated age, cooperative, and no distress, Speaking in full sentences comfortably, and Normal work of breathing    ASSESSMENT & PLAN    1. Essential hypertension (Primary)  -Monitor BP at home  -     Losartan Potassium; Take 1 tablet (25 mg total) by mouth daily.  Dispense: 90 tablet; Refill: 0  -     Comp Metabolic Panel (14); Future; Expected date: 06/14/2024  2. Hypercholesterolemia  -     Lipid Panel; Future; Expected date: 06/14/2024  3. Anxiety  Recently retired and states she would eventually like to wean off of medication.  No SHIP.  Is also on buspirone however states takes it only as needed.  Historically tried Wellbutrin but did not tolerate well.  -     Sertraline HCl; Take 1 tablet (100 mg total) by mouth every morning.  Dispense: 90 tablet; Refill: 0  4. Routine medical exam  Will be scheduling physical exam within the next 2 to 3 months.  Will get labs prior to physical.  -     CBC, Platelet; No Differential; Future; Expected date: 06/14/2024  -     Comp Metabolic Panel (14); Future; Expected date: 06/14/2024  -     Hemoglobin A1C; Future; Expected date: 06/14/2024  -     Lipid Panel; Future;  Expected date: 06/14/2024  -     TSH W Reflex To Free T4; Future; Expected date: 06/14/2024        Return for Routine physical exam visit.    Matthew Bishop MD

## 2024-06-19 DIAGNOSIS — I10 ESSENTIAL HYPERTENSION: ICD-10-CM

## 2024-06-19 RX ORDER — LOSARTAN POTASSIUM 25 MG/1
25 TABLET ORAL DAILY
Qty: 90 TABLET | Refills: 0 | OUTPATIENT
Start: 2024-06-19

## 2024-07-19 DIAGNOSIS — F41.9 ANXIETY: ICD-10-CM

## 2024-07-19 RX ORDER — SERTRALINE HYDROCHLORIDE 100 MG/1
100 TABLET, FILM COATED ORAL EVERY MORNING
Qty: 90 TABLET | Refills: 0 | OUTPATIENT
Start: 2024-07-19

## 2024-07-19 NOTE — TELEPHONE ENCOUNTER
I spoke with patient & she stated she does not need this refill at this time.  She has a physical scheduled in August & will discuss this medication at that time.      Refused - Sertraline

## 2024-08-23 ENCOUNTER — LAB ENCOUNTER (OUTPATIENT)
Dept: LAB | Age: 64
End: 2024-08-23
Attending: STUDENT IN AN ORGANIZED HEALTH CARE EDUCATION/TRAINING PROGRAM
Payer: COMMERCIAL

## 2024-08-23 DIAGNOSIS — Z00.00 ROUTINE MEDICAL EXAM: ICD-10-CM

## 2024-08-23 DIAGNOSIS — I10 ESSENTIAL HYPERTENSION: ICD-10-CM

## 2024-08-23 DIAGNOSIS — E78.00 HYPERCHOLESTEROLEMIA: ICD-10-CM

## 2024-08-23 LAB
ALBUMIN SERPL-MCNC: 5.1 G/DL (ref 3.2–4.8)
ALBUMIN/GLOB SERPL: 2.3 {RATIO} (ref 1–2)
ALP LIVER SERPL-CCNC: 47 U/L
ALT SERPL-CCNC: 25 U/L
ANION GAP SERPL CALC-SCNC: 0 MMOL/L (ref 0–18)
AST SERPL-CCNC: 31 U/L (ref ?–34)
BILIRUB SERPL-MCNC: 0.9 MG/DL (ref 0.2–1.1)
BUN BLD-MCNC: 13 MG/DL (ref 9–23)
CALCIUM BLD-MCNC: 10.2 MG/DL (ref 8.7–10.4)
CHLORIDE SERPL-SCNC: 102 MMOL/L (ref 98–112)
CHOLEST SERPL-MCNC: 253 MG/DL (ref ?–200)
CO2 SERPL-SCNC: 33 MMOL/L (ref 21–32)
CREAT BLD-MCNC: 1.06 MG/DL
EGFRCR SERPLBLD CKD-EPI 2021: 59 ML/MIN/1.73M2 (ref 60–?)
ERYTHROCYTE [DISTWIDTH] IN BLOOD BY AUTOMATED COUNT: 12.6 %
EST. AVERAGE GLUCOSE BLD GHB EST-MCNC: 108 MG/DL (ref 68–126)
FASTING PATIENT LIPID ANSWER: YES
FASTING STATUS PATIENT QL REPORTED: YES
GLOBULIN PLAS-MCNC: 2.2 G/DL (ref 2–3.5)
GLUCOSE BLD-MCNC: 97 MG/DL (ref 70–99)
HBA1C MFR BLD: 5.4 % (ref ?–5.7)
HCT VFR BLD AUTO: 37.8 %
HDLC SERPL-MCNC: 100 MG/DL (ref 40–59)
HGB BLD-MCNC: 12.7 G/DL
LDLC SERPL CALC-MCNC: 145 MG/DL (ref ?–100)
MCH RBC QN AUTO: 30.7 PG (ref 26–34)
MCHC RBC AUTO-ENTMCNC: 33.6 G/DL (ref 31–37)
MCV RBC AUTO: 91.3 FL
NONHDLC SERPL-MCNC: 153 MG/DL (ref ?–130)
OSMOLALITY SERPL CALC.SUM OF ELEC: 280 MOSM/KG (ref 275–295)
PLATELET # BLD AUTO: 278 10(3)UL (ref 150–450)
POTASSIUM SERPL-SCNC: 4.4 MMOL/L (ref 3.5–5.1)
PROT SERPL-MCNC: 7.3 G/DL (ref 5.7–8.2)
RBC # BLD AUTO: 4.14 X10(6)UL
SODIUM SERPL-SCNC: 135 MMOL/L (ref 136–145)
TRIGL SERPL-MCNC: 51 MG/DL (ref 30–149)
TSI SER-ACNC: 1.09 MIU/ML (ref 0.55–4.78)
VLDLC SERPL CALC-MCNC: 9 MG/DL (ref 0–30)
WBC # BLD AUTO: 4.5 X10(3) UL (ref 4–11)

## 2024-08-23 PROCEDURE — 84443 ASSAY THYROID STIM HORMONE: CPT

## 2024-08-23 PROCEDURE — 83036 HEMOGLOBIN GLYCOSYLATED A1C: CPT

## 2024-08-23 PROCEDURE — 36415 COLL VENOUS BLD VENIPUNCTURE: CPT

## 2024-08-23 PROCEDURE — 85027 COMPLETE CBC AUTOMATED: CPT

## 2024-08-23 PROCEDURE — 80061 LIPID PANEL: CPT

## 2024-08-23 PROCEDURE — 80053 COMPREHEN METABOLIC PANEL: CPT

## 2024-08-29 ENCOUNTER — OFFICE VISIT (OUTPATIENT)
Dept: FAMILY MEDICINE CLINIC | Facility: CLINIC | Age: 64
End: 2024-08-29
Payer: COMMERCIAL

## 2024-08-29 VITALS
HEART RATE: 76 BPM | BODY MASS INDEX: 27.66 KG/M2 | OXYGEN SATURATION: 98 % | HEIGHT: 64 IN | WEIGHT: 162 LBS | DIASTOLIC BLOOD PRESSURE: 62 MMHG | TEMPERATURE: 97 F | SYSTOLIC BLOOD PRESSURE: 106 MMHG

## 2024-08-29 DIAGNOSIS — F41.9 ANXIETY: ICD-10-CM

## 2024-08-29 DIAGNOSIS — M45.0 ANKYLOSING SPONDYLITIS OF MULTIPLE SITES IN SPINE (HCC): ICD-10-CM

## 2024-08-29 DIAGNOSIS — Z00.00 ROUTINE MEDICAL EXAM: Primary | ICD-10-CM

## 2024-08-29 DIAGNOSIS — E78.00 HYPERCHOLESTEROLEMIA: ICD-10-CM

## 2024-08-29 DIAGNOSIS — Z71.85 IMMUNIZATION COUNSELING: ICD-10-CM

## 2024-08-29 DIAGNOSIS — I10 ESSENTIAL HYPERTENSION: ICD-10-CM

## 2024-08-29 DIAGNOSIS — E66.3 OVERWEIGHT (BMI 25.0-29.9): ICD-10-CM

## 2024-08-29 DIAGNOSIS — Z23 ENCOUNTER FOR IMMUNIZATION: ICD-10-CM

## 2024-08-29 PROCEDURE — 90715 TDAP VACCINE 7 YRS/> IM: CPT | Performed by: STUDENT IN AN ORGANIZED HEALTH CARE EDUCATION/TRAINING PROGRAM

## 2024-08-29 PROCEDURE — 90471 IMMUNIZATION ADMIN: CPT | Performed by: STUDENT IN AN ORGANIZED HEALTH CARE EDUCATION/TRAINING PROGRAM

## 2024-08-29 PROCEDURE — 99396 PREV VISIT EST AGE 40-64: CPT | Performed by: STUDENT IN AN ORGANIZED HEALTH CARE EDUCATION/TRAINING PROGRAM

## 2024-08-29 NOTE — PROGRESS NOTES
Subjective:   Nicky Araya is a 63 year old female who presents for Physical     64-year-old female coming in for routine physical.  Continues to follow-up with rheumatology for ankylosing spondylitis.  Follows up with an allergist/immunologist however will be looking for a new provider at this time.  She is currently on Breo Ellipta and ProAir.  Anxiety is stable with good mood on medication.  No SHIP.  Follows up with a breast surgeon for breast imaging and screening.  Will be establishing care with a GYN for female health matters.  Patient has no acute complaints.    History/Other:    Chief Complaint Reviewed and Verified  No Further Nursing Notes to   Review  Tobacco Reviewed  Allergies Reviewed  Medications Reviewed    Problem List Reviewed  Medical History Reviewed  Surgical History   Reviewed  OB Status Reviewed  Family History Reviewed  Social History   Reviewed         Tobacco:  She has never smoked tobacco.    Current Outpatient Medications   Medication Sig Dispense Refill    losartan 25 MG Oral Tab Take 1 tablet (25 mg total) by mouth daily. 90 tablet 0    sertraline 100 MG Oral Tab Take 1 tablet (100 mg total) by mouth every morning. 90 tablet 0    fluticasone propionate 50 MCG/ACT Nasal Suspension 2 sprays by Each Nare route daily. 1 each 1    Azelastine HCl 137 MCG/SPRAY Nasal Solution       BREO ELLIPTA 100-25 MCG/ACT Inhalation Aerosol Powder, Breath Activated Inhale 1 puff into the lungs daily.      busPIRone 5 MG Oral Tab Take 1 tablet (5 mg total) by mouth 3 (three) times daily. 90 tablet 0    Meloxicam 15 MG Oral Tab Take 0.5-1 tablets (7.5-15 mg total) by mouth daily as needed.      PROAIR  (90 BASE) MCG/ACT Inhalation Aero Soln   3    Upadacitinib ER 15 MG Oral Tablet 24 Hr Take 15 mg by mouth daily.           Review of Systems:  Review of Systems   Constitutional:  Negative for chills, diaphoresis and fever.   HENT:  Negative for congestion, ear discharge, ear pain, sinus  pressure, sinus pain and sore throat.    Eyes:  Negative for pain and discharge.   Respiratory:  Negative for cough, chest tightness, shortness of breath and wheezing.    Cardiovascular:  Negative for chest pain and palpitations.   Gastrointestinal:  Negative for abdominal pain, diarrhea, nausea and vomiting.   Endocrine: Negative for cold intolerance and heat intolerance.   Genitourinary:  Negative for dysuria, flank pain, frequency and urgency.   Musculoskeletal:  Negative for joint swelling.   Skin:  Negative for rash.   Neurological:  Negative for dizziness, syncope and headaches.   Psychiatric/Behavioral:  Negative for confusion and hallucinations.        Objective:   /62 (BP Location: Right arm, Patient Position: Sitting, Cuff Size: adult)   Pulse 76   Temp 97.1 °F (36.2 °C) (Tympanic)   Ht 5' 4\" (1.626 m)   Wt 162 lb (73.5 kg)   LMP 05/16/2006 (Approximate)   SpO2 98%   BMI 27.81 kg/m²  Estimated body mass index is 27.81 kg/m² as calculated from the following:    Height as of this encounter: 5' 4\" (1.626 m).    Weight as of this encounter: 162 lb (73.5 kg).  Physical Exam  Constitutional:       General: She is not in acute distress.     Appearance: Normal appearance. She is not ill-appearing or toxic-appearing.   HENT:      Head: Normocephalic and atraumatic.      Right Ear: Tympanic membrane and ear canal normal.      Left Ear: Tympanic membrane and ear canal normal.      Mouth/Throat:      Mouth: Mucous membranes are moist.      Pharynx: Oropharynx is clear. No oropharyngeal exudate or posterior oropharyngeal erythema.   Eyes:      Extraocular Movements: Extraocular movements intact.      Pupils: Pupils are equal, round, and reactive to light.   Cardiovascular:      Rate and Rhythm: Normal rate and regular rhythm.      Heart sounds: Normal heart sounds. No murmur heard.     No gallop.   Pulmonary:      Effort: Pulmonary effort is normal. No respiratory distress.      Breath sounds: Normal  breath sounds. No stridor. No wheezing, rhonchi or rales.   Abdominal:      General: Bowel sounds are normal.      Palpations: Abdomen is soft.      Tenderness: There is no abdominal tenderness. There is no right CVA tenderness, left CVA tenderness or guarding.   Musculoskeletal:         General: No swelling.      Cervical back: Normal range of motion and neck supple. No rigidity or tenderness.      Right lower leg: No edema.      Left lower leg: No edema.   Skin:     General: Skin is warm and dry.   Neurological:      General: No focal deficit present.      Mental Status: She is alert and oriented to person, place, and time. Mental status is at baseline.      Cranial Nerves: No cranial nerve deficit.      Sensory: No sensory deficit.      Motor: Motor function is intact. No weakness.      Gait: Gait normal.   Psychiatric:         Mood and Affect: Mood normal.         Behavior: Behavior normal.         Thought Content: Thought content normal.         Judgment: Judgment normal.         Assessment & Plan:     1. Routine medical exam (Primary)  -Healthy diet and lifestyle.  -Weight loss.  -Exercise as tolerated.  -Dental exam every 6 months or as recommended by dentist.  -Eye exams annually, at least every 2 years or as recommended by specialist.  -Follow-up with breast surgeon for breast imaging/screening   -Establish care with GYN for female health matters.  -Repeat CMP 4 weeks from last.  -     Comp Metabolic Panel (14); Future; Expected date: 08/29/2024  2. Ankylosing spondylitis of multiple sites in spine (HCC)  Continue follow-up with rheumatology.  3. Essential hypertension  Well-controlled.  CPM.  4. Hypercholesterolemia  The patient's ASCVD 10 year risk score is 5.3.  -Healthy diet and lifestyle  -     CT CALCIUM SCORING; Future; Expected date: 08/29/2024  5. Anxiety  Stable.  No SHIP.  CPM.  6. Immunization counseling  Discussed pending immunizations with patient.  Agreeable for Tdap today.  -     TdaP  (Boostrix) Vaccine (> 7 Y)  7. Encounter for immunization  -     TdaP (Boostrix) Vaccine (> 7 Y)  8. Overweight (BMI 25.0-29.9)  -     DIETITIAN EDUCATION NON-DIABETES, DIET (INTERNAL)            Return in about 6 months (around 2/28/2025).    Matthew Bishop MD, 8/29/2024, 8:39 AM

## 2024-09-07 DIAGNOSIS — R09.82 PND (POST-NASAL DRIP): ICD-10-CM

## 2024-09-07 DIAGNOSIS — I10 ESSENTIAL HYPERTENSION: ICD-10-CM

## 2024-09-09 RX ORDER — FLUTICASONE PROPIONATE 50 MCG
2 SPRAY, SUSPENSION (ML) NASAL DAILY
Qty: 16 G | Refills: 0 | Status: SHIPPED | OUTPATIENT
Start: 2024-09-09

## 2024-09-09 RX ORDER — LOSARTAN POTASSIUM 25 MG/1
25 TABLET ORAL DAILY
Qty: 90 TABLET | Refills: 0 | Status: SHIPPED | OUTPATIENT
Start: 2024-09-09

## 2024-09-09 NOTE — TELEPHONE ENCOUNTER
A refill request was received for:  Requested Prescriptions     Pending Prescriptions Disp Refills    FLUTICASONE PROPIONATE 50 MCG/ACT Nasal Suspension [Pharmacy Med Name: FLUTICASONE 50MCG NASAL SP (120) RX] 16 g 0     Sig: SHAKE LIQUID AND USE 2 SPRAYS IN EACH NOSTRIL DAILY    LOSARTAN 25 MG Oral Tab [Pharmacy Med Name: LOSARTAN 25MG TABLETS] 90 tablet 0     Sig: TAKE 1 TABLET(25 MG) BY MOUTH DAILY     Last refill date:  12/22/2023 and 06/14/2024  Qty: 1 each/1 refill and 90  Dx:   Essential hypertension     PND (post-nasal drip)        Last office visit: 08/29/2024   When is follow up due: 6 months      For hormone prescriptions, date of last blood test for rx being requested:    Future Appointments   Date Time Provider Department Center   9/10/2024  1:00 PM [facility]   11/19/2024  3:30 PM Heather Cunha, [facility] [place]

## 2024-09-10 ENCOUNTER — HOSPITAL ENCOUNTER (OUTPATIENT)
Dept: MAMMOGRAPHY | Facility: HOSPITAL | Age: 64
Discharge: HOME | End: 2024-09-10
Attending: SURGERY
Payer: COMMERCIAL

## 2024-09-10 DIAGNOSIS — Z98.890 S/P LUMPECTOMY, LEFT BREAST: ICD-10-CM

## 2024-09-10 PROCEDURE — 77062 BREAST TOMOSYNTHESIS BI: CPT | Performed by: SURGERY

## 2024-09-10 PROCEDURE — 77066 DX MAMMO INCL CAD BI: CPT | Performed by: SURGERY

## 2024-09-10 PROCEDURE — 76642 ULTRASOUND BREAST LIMITED: CPT | Performed by: SURGERY

## 2024-09-10 RX ORDER — BUSPIRONE HYDROCHLORIDE 5 MG/1
5 TABLET ORAL 3 TIMES DAILY
Qty: 90 TABLET | Refills: 0 | Status: SHIPPED | OUTPATIENT
Start: 2024-09-10

## 2024-09-13 DIAGNOSIS — F41.9 ANXIETY: ICD-10-CM

## 2024-09-13 RX ORDER — SERTRALINE HYDROCHLORIDE 100 MG/1
100 TABLET, FILM COATED ORAL EVERY MORNING
Qty: 90 TABLET | Refills: 0 | Status: SHIPPED | OUTPATIENT
Start: 2024-09-13

## 2024-09-13 NOTE — TELEPHONE ENCOUNTER
A refill request was received for:  Requested Prescriptions     Pending Prescriptions Disp Refills    SERTRALINE 100 MG Oral Tab [Pharmacy Med Name: SERTRALINE 100MG TABLETS] 90 tablet 0     Sig: TAKE 1 TABLET(100 MG) BY MOUTH EVERY MORNING     Last refill date:  06/14/2024  Qty: 90 - No refills  Dx: Anxiety  Last office visit: 08/29/2024  When is follow up due: 2/28/2025      Future Appointments   Date Time Provider Department Center   9/17/2024  9:30 AM Heather Cunha PA-C [facility] [place]   11/19/2024  3:30 PM Heather Cunha PA-C [facility] [place]   3/12/2025  8:20 AM Anisa Angulo MD [facility] [place]

## 2024-09-17 ENCOUNTER — OFFICE VISIT (OUTPATIENT)
Dept: INTEGRATIVE MEDICINE | Facility: CLINIC | Age: 64
End: 2024-09-17
Payer: COMMERCIAL

## 2024-09-17 VITALS
SYSTOLIC BLOOD PRESSURE: 122 MMHG | HEART RATE: 67 BPM | DIASTOLIC BLOOD PRESSURE: 64 MMHG | HEIGHT: 64 IN | WEIGHT: 160.19 LBS | BODY MASS INDEX: 27.35 KG/M2 | OXYGEN SATURATION: 98 %

## 2024-09-17 DIAGNOSIS — E78.5 HYPERLIPIDEMIA, UNSPECIFIED HYPERLIPIDEMIA TYPE: Primary | ICD-10-CM

## 2024-09-17 DIAGNOSIS — E55.9 VITAMIN D DEFICIENCY: ICD-10-CM

## 2024-09-17 DIAGNOSIS — E61.7 DEFICIENCY OF MULTIPLE NUTRIENT ELEMENTS: ICD-10-CM

## 2024-09-17 DIAGNOSIS — E53.8 LOW SERUM VITAMIN B12: ICD-10-CM

## 2024-09-17 PROCEDURE — 99215 OFFICE O/P EST HI 40 MIN: CPT | Performed by: PHYSICIAN ASSISTANT

## 2024-09-17 NOTE — PATIENT INSTRUCTIONS
1. Dietician:  Brit Jacome  [phone]  [facility]     2. BBR Complete by [employer]    Advanced Lipid Level Support with Bergamot.  BBR Complete is a synergistic blend that naturally supports healthy blood cholesterol levels already within normal range, blood sugar balance, and overall cardiovascular function.  Take 4-5 capsules per day. May split dose to take 2-3x/day.      Serving Size: 5 Capsules  Amount Per Serving  Vitamin C ... 50mg  (as Ascorbic Acid)  Niacin ... 125mg  (as Nicotinic Acid)  Red Yeast Rice ... 1250mg  (as Monascus Purpureus)  Berberine HCL ... 500mg  (97%)  Bergamot Extract ... 1000mg  (Citrus Bergamia)(standardized to contain a minimum of 38% BPF containing Neohesperidin, Naringin, Neoeriocitrin, Brutieridin, and Melitidin)  Grape Seed Extract ... 25mg  (95% Proanthocyanidins)     ---------------------------------------------------------------------------------  Where to Purchase: [email or web address]  This is our [facility] online dispensary for vitamins and supplements where you receive a 10% discount off of supplement prices! Select the \"Log In\" and then use your email address to complete creating your personal account. Please call [employer] at [phone], if you need direct help.     The products and items listed below (the “Products”)  and their claims have not been evaluated by the Food and Drug Administration. Dietary products are not intended to treat, prevent, mitigate or cure disease. Ultimately, you must draw your own conclusion as to the efficacy of the Product and immediately stop use of the Products if a negative reaction should arise.  You should always consult a licensed health care professional before starting any supplement, dietary, or exercise program, especially if you are pregnant or have any pre-existing injuries or medical conditions. The patient agrees that the [facility] and its  Critical access hospital and its [facility] (“[facility]”) are not liable for the patients use of the Products. [facility] makes no representations or warranties of any kind, expressed or implied, as to the Products, including, but not limited to its efficacy, benefits or outcomes.  Patient agrees to contact his/her healthcare professional and stop use of Products should any reactions arise

## 2024-09-17 NOTE — PROGRESS NOTES
Nicky Araya is a 63 year old female.  Chief Complaint   Patient presents with    Follow - Up     Cholesterol and Supplements.        HPI:   64yo female patient with known h/o ankylosing spondylitis and HTN presents today to discuss hypercholesterolemia.  Recent labs 8/23/24 with PCP Dr. Bishop    Started a new med Evoq for the A.S. x6mos ago, which is working, but suspects it increased her LDL.    Tot/HDL - 2.53  ASCVD - 5.3  To get the CT Heart soon    Got off all the supplements recently  Started AG1  Started the Wahls protocol      Just retired from teaching math at [employer]    REVIEW OF SYSTEMS:   Review of Systems     Negative except for pertinent ROS in HPI      FAMILY HISTORY:      Family History   Problem Relation Age of Onset    Breast Cancer Other     Other (hypothyroidism) Sister     Other (MS) Sister     Other (arhtritis) Mother     Hypertension Mother     Other (prostate ca) Father     Other (htn) Father     Other (MI) Father     Cancer Father         Prostrate    Hypertension Father     Stroke Other     Other (chronic migraines) Daughter     Breast Cancer Paternal Aunt 75       MEDICAL HISTORY:     Past Medical History:    Amenorrhea    Post menopause    Anxiety    Atypical squamous cell changes of undetermined significance favor benign (ASCUS favor benign)    Cerebral ventriculomegaly    yearly neurologist Dr. Savage at [facility]    Depression    Dyspareunia    Eczema    Extrinsic asthma, unspecified    Hematochezia    Hemorrhoids    HLA B27 (HLA B27 positive)    Hypertension    Nipple discharge    left, bloody skin fissure    PE (pulmonary embolism)    in the lungs - 5 years ago, attributed to BC    Radiculopathy    of lumbosacral spine, cervical spine    Spondylitis, ankylosing (HCC)    subclinical hyperthyroidism    normalized    Vitamin B 12 deficiency    oral replacement       CURRENT MEDICATIONS:     Current Outpatient Medications   Medication Sig Dispense Refill    SERTRALINE 100 MG Oral  Tab TAKE 1 TABLET(100 MG) BY MOUTH EVERY MORNING 90 tablet 0    busPIRone 5 MG Oral Tab Take 1 tablet (5 mg total) by mouth 3 (three) times daily. 90 tablet 0    FLUTICASONE PROPIONATE 50 MCG/ACT Nasal Suspension SHAKE LIQUID AND USE 2 SPRAYS IN EACH NOSTRIL DAILY 16 g 0    LOSARTAN 25 MG Oral Tab TAKE 1 TABLET(25 MG) BY MOUTH DAILY 90 tablet 0    Upadacitinib ER 15 MG Oral Tablet 24 Hr Take 15 mg by mouth daily.      Azelastine HCl 137 MCG/SPRAY Nasal Solution       BREO ELLIPTA 100-25 MCG/ACT Inhalation Aerosol Powder, Breath Activated Inhale 1 puff into the lungs daily.      Meloxicam 15 MG Oral Tab Take 0.5-1 tablets (7.5-15 mg total) by mouth daily as needed.      PROAIR  (90 BASE) MCG/ACT Inhalation Aero Soln   3       SOCIAL HISTORY:   Lifestyle Factors affecting health:    Diet - Started Wahls; has been prone to grab cookies/packaged goods  Typically a stress eater  Trying to Give up her diet coke     Exercise - walking (10k) and wt training at [employer]    Stress - moderate - but better with longterm.   diagnosed with Alzheimers    Sleep - good    Social History     Socioeconomic History    Marital status:    Occupational History    Occupation: teacher   Tobacco Use    Smoking status: Never     Passive exposure: Never    Smokeless tobacco: Never   Vaping Use    Vaping status: Never Used   Substance and Sexual Activity    Alcohol use: Yes     Comment: 2 drinks per week    Drug use: No    Sexual activity: Yes     Partners: Male     Comment: menopause   Other Topics Concern    Blood Transfusions No    Caffeine Concern No    Stress Concern No    Weight Concern Yes    Special Diet No    Exercise Yes    Seat Belt No   Social History Narrative    Teacher - Math in [employer].    2 children - aged 21 and 22 yo (July 2019)     -  diagnosed with early-onset Alzheimer's (7/2021)       SURGICAL HISTORY:     Past Surgical History:   Procedure Laterality Date    Colonoscopy,biopsy   07/2011    Colposcopy, cervix w/upper adjacent vagina; w/biopsy(s), cervix  2000    Hip replacement surgery  02/2014    Left Hip    Knee arthroscopy      age 21 right knee    Leep  2000    Steven biopsy stereo nodule 1 site left (cpt=19081)  2012    benign    Other surgical history  09/26/2012    Excision of lipomatous mass - left arm performed by Dr. Terrell at [facility]    Stereotactic breast biopsy  3/6/12 [facility]    Left breast    Tonsillectomy      age 5       PHYSICAL EXAM:     Vitals:    09/17/24 0938   BP: 122/64   BP Location: Right arm   Patient Position: Sitting   Cuff Size: adult   Pulse: 67   SpO2: 98%   Weight: 160 lb 3.2 oz (72.7 kg)   Height: 5' 4\" (1.626 m)       Physical Exam  Constitutional:       General: She is not in acute distress.     Appearance: Normal appearance. She is not ill-appearing or toxic-appearing.   HENT:      Head: Normocephalic and atraumatic.   Eyes:      Extraocular Movements: Extraocular movements intact.   Pulmonary:      Effort: Pulmonary effort is normal. No respiratory distress.   Musculoskeletal:      Cervical back: Normal range of motion.   Skin:     Coloration: Skin is not jaundiced.      Findings: No lesion.   Neurological:      Mental Status: She is alert and oriented to person, place, and time.   Psychiatric:         Mood and Affect: Mood normal.         Behavior: Behavior normal.         Thought Content: Thought content normal.         Judgment: Judgment normal.          ASSESSMENT AND PLAN:     [facility] Lab Encounter on 08/23/2024   Component Date Value Ref Range Status    WBC 08/23/2024 4.5  4.0 - 11.0 x10(3) uL Final    RBC 08/23/2024 4.14  3.80 - 5.30 x10(6)uL Final    HGB 08/23/2024 12.7  12.0 - 16.0 g/dL Final    HCT 08/23/2024 37.8  35.0 - 48.0 % Final    PLT 08/23/2024 278.0  150.0 - 450.0 10(3)uL Final    MCV 08/23/2024 91.3  80.0 - 100.0 fL Final    MCH 08/23/2024 30.7  26.0 - 34.0 pg Final    MCHC 08/23/2024 33.6  31.0 - 37.0 g/dL Final    RDW 08/23/2024  12.6  % Final    Glucose 08/23/2024 97  70 - 99 mg/dL Final    Sodium 08/23/2024 135 (L)  136 - 145 mmol/L Final    Potassium 08/23/2024 4.4  3.5 - 5.1 mmol/L Final    Chloride 08/23/2024 102  98 - 112 mmol/L Final    CO2 08/23/2024 33.0 (H)  21.0 - 32.0 mmol/L Final    Anion Gap 08/23/2024 0  0 - 18 mmol/L Final    BUN 08/23/2024 13  9 - 23 mg/dL Final    Creatinine 08/23/2024 1.06 (H)  0.55 - 1.02 mg/dL Final    Calcium, Total 08/23/2024 10.2  8.7 - 10.4 mg/dL Final    Calculated Osmolality 08/23/2024 280  275 - 295 mOsm/kg Final    eGFR-Cr 08/23/2024 59 (L)  >=60 mL/min/1.73m2 Final    AST 08/23/2024 31  <34 U/L Final    ALT 08/23/2024 25  10 - 49 U/L Final    Alkaline Phosphatase 08/23/2024 47 (L)  50 - 130 U/L Final    Bilirubin, Total 08/23/2024 0.9  0.2 - 1.1 mg/dL Final    Total Protein 08/23/2024 7.3  5.7 - 8.2 g/dL Final    Albumin 08/23/2024 5.1 (H)  3.2 - 4.8 g/dL Final    Globulin  08/23/2024 2.2  2.0 - 3.5 g/dL Final    A/G Ratio 08/23/2024 2.3 (H)  1.0 - 2.0 Final    Patient Fasting for CMP? 08/23/2024 Yes   Final    HgbA1C 08/23/2024 5.4  <5.7 % Final     Normal HbA1C:     <5.7%      Pre-Diabetic:     5.7 - 6.4%      Diabetic:         >6.4%      Diabetic Control: <7.0%        Estimated Average Glucose 08/23/2024 108  68 - 126 mg/dL Final    eAG is the estimated average glucose calculated from Hgb A1c according to the formula recommended by the American Diabetes Association. eAG levels reflect the long term average glucose and may not correlate with random or fasting glucose levels since these represent specific points in time.           Cholesterol, Total 08/23/2024 253 (H)  <200 mg/dL Final    Desirable  <200 mg/dL  Borderline  200-239 mg/dL  High      >=240 mg/dL        HDL Cholesterol 08/23/2024 100 (H)  40 - 59 mg/dL Final    Interpretive Information:   An HDL cholesterol <40 mg/dL is low and constitutes a coronary heart disease risk factor. An HDL cholesterol >60 mg/dL is a negative risk factor  for coronary heart disease.        Triglycerides 08/23/2024 51  30 - 149 mg/dL Final    Reference interval for fasting triglycerides  Desirable: <150 mg/dL  Borderline: 150-199 mg/dL  High: 200-499 mg/dL  Very High: >=500 mg/dL          LDL Cholesterol 08/23/2024 145 (H)  <100 mg/dL Final    Optimal            <100 mg/dL   Near/Above OptimaL 100-129 mg/dL   Borderline High    130-159 mg/dL   High               160-189 mg/dL    Very High          >190 mg/dL         VLDL 08/23/2024 9  0 - 30 mg/dL Final    Non HDL Chol 08/23/2024 153 (H)  <130 mg/dL Final    Desirable  <130 mg/dL   Borderline  130-159 mg/dL   High        160-189 mg/dL       Very high >=190 mg/dL        Patient Fasting for Lipid? 08/23/2024 Yes   Final    TSH 08/23/2024 1.092  0.550 - 4.780 mIU/mL Final      US BREAST LEFT LIMITED ([facility] SAME DAY US)(BQD=93054)    Result Date: 9/10/2024  CONCLUSION:  **PLEASE SEE REPORT FOR DIAGNOSTIC MAMMOGRAM**   LOCATION:  [place]            Dictated by (CST): Dione Dawson MD on 9/10/2024 at 2:07 PM     Finalized by (CST): Dione Dawson MD on 9/10/2024 at 2:08 PM   [address] [address] [phone]    [facility] ROSE 2D+3D DIAGNOSTIC [facility]  BILAT (CPT=77066/76212)    Result Date: 9/10/2024  CONCLUSION:  Stable mammogram.  No mammographic or sonographic explanation for patient's lateral left breast pain.   BI-RADS CATEGORY:  DIAGNOSTIC CATEGORY 2 - BENIGN FINDING:   RECOMMENDATIONS:  ROUTINE MAMMOGRAM AND CLINICAL EVALUATION IN 12 MONTHS.   Because of breast density, this patient may benefit from supplemental screening with breast MRI, Molecular Breast Imaging (MBI) or bilateral whole breast ultrasound for increased sensitivity for detection of cancer which can be obscured mammographically.   Please contact your ordering provider to discuss supplemental screening options.   A letter explaining the results in lay terms has been sent to the patient.  This exam was evaluated with a  computer-aided device.  This patient's information has been entered into a reminder system with a target due date for the next mammogram.   LOCATION:  [place]   Dictated by (CST): Dione Dawson MD on 9/10/2024 at 1:51 PM     Finalized by (CST): Dione Dawson MD on 9/10/2024 at 1:53 PM   [address] [address] [phone]     1. Hyperlipidemia, unspecified hyperlipidemia type  - NMR Lipoprofile Test; Future  - Lipoprotein (A); Future  - Apolipoprotein B [E]; Future    2. Vitamin D deficiency  - Vitamin D; Future    3. Low serum vitamin B12  - Vitamin B12; Future  - Folic Acid Serum (Folate); Future    4. Deficiency of multiple nutrient elements  - Folic Acid Serum (Folate); Future      Time spent with patient: Over 50 minutes spent in chart review and in direct communication with patient obtaining and reviewing history, creating a unique care plan, explaining the rationale for treatment, reviewing potential SE and overall treatment plan,  documenting all clinical information in Epic. Over 50% of this time was in education, counseling and coordination of care.     Problem List Items Addressed This Visit    None  Visit Diagnoses       Hyperlipidemia, unspecified hyperlipidemia type    -  Primary    Relevant Orders    NMR Lipoprofile Test    Lipoprotein (A)    Apolipoprotein B [E]    Vitamin D deficiency        Relevant Orders    Vitamin D    Low serum vitamin B12        Relevant Orders    Vitamin B12    Folic Acid Serum (Folate)    Deficiency of multiple nutrient elements        Relevant Orders    Folic Acid Serum (Folate)           Cardiometabolic:  Hemoglobin A1c stable at 5.4  History of hypertension  Hyperlipidemia -LDL increased on most recent labs in August.  Discussed multiple factors that could be contributing including new medication Evoque, some processed and high glycemic foods in diet, stress.  Discussed some alternatives in regards to dietary choices and recommend  evaluation with nutritionist to implement specific food plans for her and her .  She has made some positive changes already including more regular exercise and following more of a wahls protocol.  -> Additional cholesterol markers added to labs to have drawn  -> She plans to have a heart CT soon as ordered by Dr. Bishop  -> Recommend BBR complete to support cholesterol and blood sugar    Given further recommendations as below    Orders Placed This Visit:  Orders Placed This Encounter   Procedures    Vitamin D    Vitamin B12    Folic Acid Serum (Folate)    NMR Lipoprofile Test    Lipoprotein (A)    Apolipoprotein B [E]     No orders of the defined types were placed in this encounter.      Patient Instructions    1. Dietician:  Brit Jacome  [phone]  [place]     2. BBR Complete by [employer]    Advanced Lipid Level Support with Bergamot.  BBR Complete is a synergistic blend that naturally supports healthy blood cholesterol levels already within normal range, blood sugar balance, and overall cardiovascular function.  Take 4-5 capsules per day. May split dose to take 2-3x/day.      Serving Size: 5 Capsules  Amount Per Serving  Vitamin C ... 50mg  (as Ascorbic Acid)  Niacin ... 125mg  (as Nicotinic Acid)  Red Yeast Rice ... 1250mg  (as Monascus Purpureus)  Berberine HCL ... 500mg  (97%)  Bergamot Extract ... 1000mg  (Citrus Bergamia)(standardized to contain a minimum of 38% BPF containing Neohesperidin, Naringin, Neoeriocitrin, Brutieridin, and Melitidin)  Grape Seed Extract ... 25mg  (95% Proanthocyanidins)     ---------------------------------------------------------------------------------  Where to Purchase: [email or web address]  This is our [facility] online dispensary for vitamins and supplements where you receive a 10% discount off of supplement prices! Select the \"Log In\" and then use your email address to complete creating your  personal account. Please call Freddie at [phone], if you need direct help.     The products and items listed below (the “Products”)  and their claims have not been evaluated by the Food and Drug Administration. Dietary products are not intended to treat, prevent, mitigate or cure disease. Ultimately, you must draw your own conclusion as to the efficacy of the Product and immediately stop use of the Products if a negative reaction should arise.  You should always consult a licensed health care professional before starting any supplement, dietary, or exercise program, especially if you are pregnant or have any pre-existing injuries or medical conditions. The patient agrees that the [facility] and its affiliates and its [facility] (“[facility]”) are not liable for the patients use of the Products. [facility] makes no representations or warranties of any kind, expressed or implied, as to the Products, including, but not limited to its efficacy, benefits or outcomes.  Patient agrees to contact his/her healthcare professional and stop use of Products should any reactions arise        Return in about 6 months (around 3/17/2025) for x30min for Lab results: hyperlipidemia, HTN, ankylosing spondylitis.    Patient affirmed understanding of plan and all questions were answered.     Heather Cunha PA-C

## 2024-10-04 ENCOUNTER — LAB ENCOUNTER (OUTPATIENT)
Dept: LAB | Age: 64
End: 2024-10-04
Attending: PHYSICIAN ASSISTANT
Payer: COMMERCIAL

## 2024-10-04 DIAGNOSIS — E78.5 HYPERLIPIDEMIA, UNSPECIFIED HYPERLIPIDEMIA TYPE: ICD-10-CM

## 2024-10-04 DIAGNOSIS — E53.8 LOW SERUM VITAMIN B12: ICD-10-CM

## 2024-10-04 DIAGNOSIS — E61.7 DEFICIENCY OF MULTIPLE NUTRIENT ELEMENTS: ICD-10-CM

## 2024-10-04 DIAGNOSIS — E55.9 VITAMIN D DEFICIENCY: ICD-10-CM

## 2024-10-04 LAB
FOLATE SERPL-MCNC: 11.1 NG/ML (ref 5.4–?)
VIT B12 SERPL-MCNC: 404 PG/ML (ref 211–911)
VIT D+METAB SERPL-MCNC: 25.8 NG/ML (ref 30–100)

## 2024-10-04 PROCEDURE — 36415 COLL VENOUS BLD VENIPUNCTURE: CPT

## 2024-10-04 PROCEDURE — 83704 LIPOPROTEIN BLD QUAN PART: CPT

## 2024-10-04 PROCEDURE — 82746 ASSAY OF FOLIC ACID SERUM: CPT

## 2024-10-04 PROCEDURE — 83695 ASSAY OF LIPOPROTEIN(A): CPT

## 2024-10-04 PROCEDURE — 82172 ASSAY OF APOLIPOPROTEIN: CPT

## 2024-10-04 PROCEDURE — 80061 LIPID PANEL: CPT

## 2024-10-04 PROCEDURE — 82607 VITAMIN B-12: CPT

## 2024-10-04 PROCEDURE — 82306 VITAMIN D 25 HYDROXY: CPT

## 2024-10-06 LAB — APOLIPOPROTEIN B: 109 MG/DL

## 2024-10-07 LAB — LIPOPROTEIN (A): 30.1 NMOL/L

## 2024-10-08 LAB
CHOL TOTAL: 254 MG/DL
HDL-C: 94 MG/DL
HDL-P TOTAL: 45.7 UMOL/L
LC LDL-C (NIH CALC): 152 MG/DL
LDL SIZE: 21.9 NM
LDL-P: 1254 NMOL/L
LP-IR SCORE: <25
SMALL LDL-P: 225 NMOL/L
TRIGLYCERIDES: 53 MG/DL

## 2024-10-17 DIAGNOSIS — F41.9 ANXIETY: ICD-10-CM

## 2024-10-17 RX ORDER — SERTRALINE HYDROCHLORIDE 100 MG/1
100 TABLET, FILM COATED ORAL EVERY MORNING
Qty: 90 TABLET | Refills: 0 | OUTPATIENT
Start: 2024-10-17

## 2024-11-19 ENCOUNTER — OFFICE VISIT (OUTPATIENT)
Dept: INTEGRATIVE MEDICINE | Facility: CLINIC | Age: 64
End: 2024-11-19
Payer: COMMERCIAL

## 2024-11-19 VITALS
SYSTOLIC BLOOD PRESSURE: 118 MMHG | BODY MASS INDEX: 26.94 KG/M2 | HEIGHT: 64 IN | OXYGEN SATURATION: 97 % | DIASTOLIC BLOOD PRESSURE: 78 MMHG | HEART RATE: 78 BPM | WEIGHT: 157.81 LBS

## 2024-11-19 DIAGNOSIS — E55.9 VITAMIN D DEFICIENCY: ICD-10-CM

## 2024-11-19 DIAGNOSIS — E78.5 HYPERLIPIDEMIA, UNSPECIFIED HYPERLIPIDEMIA TYPE: Primary | ICD-10-CM

## 2024-11-19 DIAGNOSIS — R73.03 PREDIABETES: ICD-10-CM

## 2024-11-19 DIAGNOSIS — M85.80 OSTEOPENIA, UNSPECIFIED LOCATION: ICD-10-CM

## 2024-11-19 DIAGNOSIS — E53.8 LOW SERUM VITAMIN B12: ICD-10-CM

## 2024-11-19 PROCEDURE — 99215 OFFICE O/P EST HI 40 MIN: CPT | Performed by: PHYSICIAN ASSISTANT

## 2024-11-19 NOTE — PROGRESS NOTES
Nicky Araya is a 63 year old female.  Chief Complaint   Patient presents with    Follow - Up     Lab results - medications        HPI:   64yo female patient with known h/o ankylosing spondylitis and HTN presents today to discuss hypercholesterolemia.  labs 10/4/24-   8/23/24 with PCP Dr. Bishop    Started a new med Evoq for the A.S. x6mos ago, which is working, but suspects it increased her LDL. Pharmacist confirmed it can impact cholesterol because it is a YAZAN inhibitor.    Just made an appt with Brit Jacome to balance diets for both her and her  (EOE + Alzheimers).    Has started the BBR complete.     CT Heart scheduled for Sat night    Menopause was in 40s   Had a PE on OCP 2006 at 47yo so never thought she could be on HRT    Significantly low Vit D  Does not eat/drink dairy  + Osteopenia on 2021 DEXA scan  ------------------------------  Tot/HDL - 2.53  ASCVD - 5.3  To get the CT Heart soon    Got off all the supplements recently  Started AG1  Started the Was protocol      Just retired from teaching math at [employer]    REVIEW OF SYSTEMS:   Review of Systems     Negative except for pertinent ROS in HPI      FAMILY HISTORY:      Family History   Problem Relation Age of Onset    Breast Cancer Other     Other (hypothyroidism) Sister     Other (MS) Sister     Other (arhtritis) Mother     Hypertension Mother     Other (prostate ca) Father     Other (htn) Father     Other (MI) Father     Cancer Father         Prostrate    Hypertension Father     Stroke Other     Other (chronic migraines) Daughter     Breast Cancer Paternal Aunt 75       MEDICAL HISTORY:     Past Medical History:    Amenorrhea    Post menopause    Anxiety    Atypical squamous cell changes of undetermined significance favor benign (ASCUS favor benign)    Cerebral ventriculomegaly    yearly neurologist Dr. Savage at [facility]    Depression    Dyspareunia    Eczema    Extrinsic asthma, unspecified    Hematochezia    Hemorrhoids    HLA B27  (HLA B27 positive)    Hypertension    Nipple discharge    left, bloody skin fissure    PE (pulmonary embolism)    in the lungs - 5 years ago, attributed to BC    Radiculopathy    of lumbosacral spine, cervical spine    Spondylitis, ankylosing (HCC)    subclinical hyperthyroidism    normalized    Vitamin B 12 deficiency    oral replacement       CURRENT MEDICATIONS:     Current Outpatient Medications   Medication Sig Dispense Refill    SERTRALINE 100 MG Oral Tab TAKE 1 TABLET(100 MG) BY MOUTH EVERY MORNING 90 tablet 0    busPIRone 5 MG Oral Tab Take 1 tablet (5 mg total) by mouth 3 (three) times daily. 90 tablet 0    FLUTICASONE PROPIONATE 50 MCG/ACT Nasal Suspension SHAKE LIQUID AND USE 2 SPRAYS IN EACH NOSTRIL DAILY 16 g 0    LOSARTAN 25 MG Oral Tab TAKE 1 TABLET(25 MG) BY MOUTH DAILY 90 tablet 0    Upadacitinib ER 15 MG Oral Tablet 24 Hr Take 15 mg by mouth daily.      Azelastine HCl 137 MCG/SPRAY Nasal Solution       BREO ELLIPTA 100-25 MCG/ACT Inhalation Aerosol Powder, Breath Activated Inhale 1 puff into the lungs daily.      Meloxicam 15 MG Oral Tab Take 0.5-1 tablets (7.5-15 mg total) by mouth daily as needed.      PROAIR  (90 BASE) MCG/ACT Inhalation Aero Soln   3       SOCIAL HISTORY:   Lifestyle Factors affecting health:    Diet - Started Wahls; has been prone to grab cookies/packaged goods  Typically a stress eater  Has started the Zevia increased of the diet coke     Exercise - walking (10k) and wt training at [employer], biking    Stress - moderate - but better with FCI.   diagnosed with Alzheimers    Sleep - good    Social History     Socioeconomic History    Marital status:    Occupational History    Occupation: teacher   Tobacco Use    Smoking status: Never     Passive exposure: Never    Smokeless tobacco: Never   Vaping Use    Vaping status: Never Used   Substance and Sexual Activity    Alcohol use: Yes     Comment: 2 drinks per week    Drug use: No    Sexual activity: Yes      Partners: Male     Comment: menopause   Other Topics Concern    Blood Transfusions No    Caffeine Concern No    Stress Concern No    Weight Concern Yes    Special Diet No    Exercise Yes    Seat Belt No   Social History Narrative    Teacher - Math in [employer].    2 children - aged 21 and 24 yo (July 2019)     -  diagnosed with early-onset Alzheimer's (7/2021)       SURGICAL HISTORY:     Past Surgical History:   Procedure Laterality Date    Colonoscopy,biopsy  07/2011    Colposcopy, cervix w/upper adjacent vagina; w/biopsy(s), cervix  2000    Hip replacement surgery  02/2014    Left Hip    Knee arthroscopy      age 21 right knee    Leep  2000    Steven biopsy stereo nodule 1 site left (cpt=19081)  2012    benign    Other surgical history  09/26/2012    Excision of lipomatous mass - left arm performed by Dr. Terrell at [facility]    Stereotactic breast biopsy  3/6/12 [facility]    Left breast    Tonsillectomy      age 5       PHYSICAL EXAM:     Vitals:    11/19/24 1540   BP: 118/78   BP Location: Right arm   Patient Position: Sitting   Cuff Size: adult   Pulse: 78   SpO2: 97%   Weight: 157 lb 12.8 oz (71.6 kg)   Height: 5' 4\" (1.626 m)       Physical Exam  Constitutional:       General: She is not in acute distress.     Appearance: Normal appearance. She is not ill-appearing or toxic-appearing.   HENT:      Head: Normocephalic and atraumatic.   Eyes:      Extraocular Movements: Extraocular movements intact.   Pulmonary:      Effort: Pulmonary effort is normal. No respiratory distress.   Musculoskeletal:      Cervical back: Normal range of motion.   Skin:     Coloration: Skin is not jaundiced.      Findings: No lesion.   Neurological:      Mental Status: She is alert and oriented to person, place, and time.   Psychiatric:         Mood and Affect: Mood normal.         Behavior: Behavior normal.         Thought Content: Thought content normal.         Judgment: Judgment normal.          ASSESSMENT AND  PLAN:     [facility] Lab Encounter on 10/04/2024   Component Date Value Ref Range Status    Vitamin B12 10/04/2024 404  211 - 911 pg/mL Final    Vitamin B12 Reference Range   Deficient:      <150 pg/mL   Indeterminate   150 - 211 pg/mL  Normal:         211 - 911 pg/mL       Folate (Folic Acid) 10/04/2024 11.1  >5.4 ng/mL Final    This test may exhibit interference when a sample is collected from a person who is consuming high dose of biotin (a.k.a., vitamin B7, vitamin H, coenzyme R) supplements resulting in serum concentrations >50 ng/mL.  Intake of the recommended daily allowance (RDA) for biotin (0.03 mg) has not been shown to typically cause significant interference; however, high dose daily dietary supplements may contain biotin concentrations greater than 150 times (5-10 mg) the RDA.  It is recommended that physicians ask all patients who may be on biotin supplementation to stop biotin consumption at least 72 hours prior to collection of a new sample.      LDL-P 10/04/2024 1254 (H)  <1000 nmol/L Final                              Low                   < 1000                            Moderate         1000 - 1299                            Borderline-High  1300 - 1599                            High             1600 - 2000                            Very High             > 2000    LDL-C (NIH Calc) 10/04/2024 152 (H)  0 - 99 mg/dL Final                              Optimal               <  100                            Above optimal     100 -  129                            Borderline        130 -  159                            High              160 -  189                            Very high             >  189    HDL-C 10/04/2024 94  >39 mg/dL Final    Triglycerides 10/04/2024 53  0 - 149 mg/dL Final    Chol Total 10/04/2024 254 (H)  100 - 199 mg/dL Final    HDL-P Total 10/04/2024 45.7  >=30.5 umol/L Final    Small LDL-P 10/04/2024 225  <=527 nmol/L Final    LDL Size 10/04/2024 21.9  >20.5 nm Final      ----------------------------------------------------------                   ** INTERPRETATIVE INFORMATION**                   PARTICLE CONCENTRATION AND SIZE                      <--Lower CVD Risk   Higher CVD Risk-->    LDL AND HDL PARTICLES   Percentile in Reference Population    HDL-P (total)        High     75th    50th    25th   Low                         >34.9    34.9    30.5    26.7   <26.7    Small LDL-P          Low      25th    50th    75th   High                         <117     117     527     839    >839    LDL Size   <-Large (Pattern A)->    <-Small (Pattern B)->                      23.0    20.6           20.5      19.0   ----------------------------------------------------------  Small LDL-P and LDL Size are associated with CVD risk, but not after  LDL-P is taken into account.    LP-IR Score 10/04/2024 <25  <=45 Final    INSULIN RESISTANCE MARKER      <--Insulin Sensitive    Insulin Resistant-->             Percentile in Reference Population  Insulin Resistance Score  LP-IR Score   Low   25th   50th   75th   High                <27   27     45     63     >63  LP-IR Score is inaccurate if patient is non-fasting.  The LP-IR score is a laboratory developed index that has been  associated with insulin resistance and diabetes risk and should be  used as one component of a physician's clinical assessment.    Lipoprotein (a) 10/04/2024 30.1  <75.0 nmol/L Final    Note:  Values greater than or equal to 75.0 nmol/L may         indicate an independent risk factor for CHD,         but must be evaluated with caution when applied         to non- populations due to the         influence of genetic factors on Lp(a) across         ethnicities.    Apolipoprotein B 10/04/2024 109 (H)  <90 mg/dL Final                             Desirable               < 90                           Borderline High     90 -  99                           High               100 - 130                           Very High                >130       --------------------------------------------------            ASCVD RISK              THERAPEUTIC TARGET             CATEGORY                  APO B (mg/dL)          Very High Risk        <80 (if extreme risk <70)          High Risk             <90          Moderate Risk         <90    Vitamin D, 25OH, Total 10/04/2024 25.8 (L)  30.0 - 100.0 ng/mL Final    Literature Recommendations for 25(OH)D levels are:  Range           Vitamin D Status   <20    ng/mL      Deficiency   20-<30 ng/mL      Insufficiency    ng/mL      Sufficiency   >100   ng/mL      Toxicity    *Clinical controversy exists regarding optimal 25(OH)D levels. Emerging evidence links potential adverse effects to high levels, particularly >60 ng/mL.       [facility] Lab Encounter on 08/23/2024   Component Date Value Ref Range Status    WBC 08/23/2024 4.5  4.0 - 11.0 x10(3) uL Final    RBC 08/23/2024 4.14  3.80 - 5.30 x10(6)uL Final    HGB 08/23/2024 12.7  12.0 - 16.0 g/dL Final    HCT 08/23/2024 37.8  35.0 - 48.0 % Final    PLT 08/23/2024 278.0  150.0 - 450.0 10(3)uL Final    MCV 08/23/2024 91.3  80.0 - 100.0 fL Final    MCH 08/23/2024 30.7  26.0 - 34.0 pg Final    MCHC 08/23/2024 33.6  31.0 - 37.0 g/dL Final    RDW 08/23/2024 12.6  % Final    Glucose 08/23/2024 97  70 - 99 mg/dL Final    Sodium 08/23/2024 135 (L)  136 - 145 mmol/L Final    Potassium 08/23/2024 4.4  3.5 - 5.1 mmol/L Final    Chloride 08/23/2024 102  98 - 112 mmol/L Final    CO2 08/23/2024 33.0 (H)  21.0 - 32.0 mmol/L Final    Anion Gap 08/23/2024 0  0 - 18 mmol/L Final    BUN 08/23/2024 13  9 - 23 mg/dL Final    Creatinine 08/23/2024 1.06 (H)  0.55 - 1.02 mg/dL Final    Calcium, Total 08/23/2024 10.2  8.7 - 10.4 mg/dL Final    Calculated Osmolality 08/23/2024 280  275 - 295 mOsm/kg Final    eGFR-Cr 08/23/2024 59 (L)  >=60 mL/min/1.73m2 Final    AST 08/23/2024 31  <34 U/L Final    ALT 08/23/2024 25  10 - 49 U/L Final    Alkaline Phosphatase 08/23/2024 47 (L)  50 - 130 U/L  Final    Bilirubin, Total 08/23/2024 0.9  0.2 - 1.1 mg/dL Final    Total Protein 08/23/2024 7.3  5.7 - 8.2 g/dL Final    Albumin 08/23/2024 5.1 (H)  3.2 - 4.8 g/dL Final    Globulin  08/23/2024 2.2  2.0 - 3.5 g/dL Final    A/G Ratio 08/23/2024 2.3 (H)  1.0 - 2.0 Final    Patient Fasting for CMP? 08/23/2024 Yes   Final    HgbA1C 08/23/2024 5.4  <5.7 % Final     Normal HbA1C:     <5.7%      Pre-Diabetic:     5.7 - 6.4%      Diabetic:         >6.4%      Diabetic Control: <7.0%        Estimated Average Glucose 08/23/2024 108  68 - 126 mg/dL Final    eAG is the estimated average glucose calculated from Hgb A1c according to the formula recommended by the American Diabetes Association. eAG levels reflect the long term average glucose and may not correlate with random or fasting glucose levels since these represent specific points in time.           Cholesterol, Total 08/23/2024 253 (H)  <200 mg/dL Final    Desirable  <200 mg/dL  Borderline  200-239 mg/dL  High      >=240 mg/dL        HDL Cholesterol 08/23/2024 100 (H)  40 - 59 mg/dL Final    Interpretive Information:   An HDL cholesterol <40 mg/dL is low and constitutes a coronary heart disease risk factor. An HDL cholesterol >60 mg/dL is a negative risk factor for coronary heart disease.        Triglycerides 08/23/2024 51  30 - 149 mg/dL Final    Reference interval for fasting triglycerides  Desirable: <150 mg/dL  Borderline: 150-199 mg/dL  High: 200-499 mg/dL  Very High: >=500 mg/dL          LDL Cholesterol 08/23/2024 145 (H)  <100 mg/dL Final    Optimal            <100 mg/dL   Near/Above OptimaL 100-129 mg/dL   Borderline High    130-159 mg/dL   High               160-189 mg/dL    Very High          >190 mg/dL         VLDL 08/23/2024 9  0 - 30 mg/dL Final    Non HDL Chol 08/23/2024 153 (H)  <130 mg/dL Final    Desirable  <130 mg/dL   Borderline  130-159 mg/dL   High        160-189 mg/dL       Very high >=190 mg/dL        Patient Fasting for Lipid? 08/23/2024 Yes   Final     TSH 08/23/2024 1.092  0.550 - 4.780 mIU/mL Final      US BREAST LEFT LIMITED ([facility] SAME DAY US)(PET=[id number])    Result Date: 9/10/2024  CONCLUSION:  **PLEASE SEE REPORT FOR DIAGNOSTIC MAMMOGRAM**   LOCATION:  [facility]            Dictated by (CST): Dione Dawson MD on 9/10/2024 at 2:07 PM     Finalized by (CST): Dione Dawson MD on 9/10/2024 at 2:08 PM   [address] [address] [phone]    [facility] ROSE 2D+3D DIAGNOSTIC [facility]  BILAT (CPT=77066/18117)    Result Date: 9/10/2024  CONCLUSION:  Stable mammogram.  No mammographic or sonographic explanation for patient's lateral left breast pain.   BI-RADS CATEGORY:  DIAGNOSTIC CATEGORY 2 - BENIGN FINDING:   RECOMMENDATIONS:  ROUTINE MAMMOGRAM AND CLINICAL EVALUATION IN 12 MONTHS.   Because of breast density, this patient may benefit from supplemental screening with breast MRI, Molecular Breast Imaging (MBI) or bilateral whole breast ultrasound for increased sensitivity for detection of cancer which can be obscured mammographically.   Please contact your ordering provider to discuss supplemental screening options.   A letter explaining the results in lay terms has been sent to the patient.  This exam was evaluated with a computer-aided device.  This patient's information has been entered into a reminder system with a target due date for the next mammogram.   LOCATION:  [facility]   Dictated by (CST): Dione Dawson MD on 9/10/2024 at 1:51 PM     Finalized by (CST): Dione Dawson MD on 9/10/2024 at 1:53 PM   [address] [address] [phone]     1. Hyperlipidemia, unspecified hyperlipidemia type  - Lipid Panel; Future    2. Vitamin D deficiency  - Vitamin D; Future    3. Low serum vitamin B12  - Vitamin B12; Future    4. Osteopenia, unspecified location    5. Prediabetes  - Hemoglobin A1C; Future  - Insulin; Future        Time spent with patient: Over 50 minutes spent in chart review and in direct  communication with patient obtaining and reviewing history, creating a unique care plan, explaining the rationale for treatment, reviewing potential SE and overall treatment plan,  documenting all clinical information in Epic. Over 50% of this time was in education, counseling and coordination of care.     Problem List Items Addressed This Visit          Musculoskeletal and Injuries    Osteopenia     Other Visit Diagnoses       Hyperlipidemia, unspecified hyperlipidemia type    -  Primary    Relevant Orders    Lipid Panel    Vitamin D deficiency        Relevant Orders    Vitamin D    Low serum vitamin B12        Relevant Orders    Vitamin B12    Prediabetes        Relevant Orders    Hemoglobin A1C    Insulin             Cardiometabolic:  Hemoglobin A1c stable at 5.4  History of hypertension  Hyperlipidemia -LDL increased on most recent labs in August.  Discussed multiple factors that could be contributing including new medication Evoque, some processed and high glycemic foods in diet, stress.  Discussed some alternatives in regards to dietary choices and recommend evaluation with nutritionist to implement specific food plans for her and her .  She has made some positive changes already including more regular exercise and following more of a wahls protocol.  -> LP(a) is low  -> Apolipoprotein B elevated  -> NMR profile revealed healthy large pattern \"A\" particle size, high HDL, increased LDL-P  -> She plans to have a heart CT on Saturday as ordered by Dr. Bishop  -> Continue BBR complete to support cholesterol and blood sugar    Osteopenia per DEXA scan in 2021  - Minimal calcium intake per patient  -Was never on HRT  -> Recommend trial of Fosteum plus  -> Significantly low vitamin D, see recommendation below  -> Multivitamin and mineral support through [employer] (low B vitamins as well)    Given further recommendations as below    Orders Placed This Visit:  Orders Placed This Encounter   Procedures     Vitamin D    Lipid Panel    Hemoglobin A1C    Insulin    Vitamin B12     No orders of the defined types were placed in this encounter.      Patient Instructions   Vitamin D3 + K from [employer]    Provides 5,000 IU per softgel of vitamin D (as D3), designed for greater absorption. This high-potency formula also includes bioavailable forms of vitamin K2 (menaquinone-7) to complement vitamin D.    ?Suggested Use:  Take one softgel daily, then increase to 2 capsules daily for 2mos.  ------------------------------------------------------------------------------      [employer] Phyt[employer] - Multivitamin with plant based antioxidant support       Dose: 2  tabs daily with food, Avoid taking on empty stomach.      Where to Purchase: [email or web address]  This is our [facility] online dispensary for vitamins and supplements where you receive a 10% discount off of supplement prices! Select the \"Log In\" and then use your email address to complete creating your personal account. Please call [employer] at [phone], if you need direct help.     The products and items listed below (the “Products”)  and their claims have not been evaluated by the Food and Drug Administration. Dietary products are not intended to treat, prevent, mitigate or cure disease. Ultimately, you must draw your own conclusion as to the efficacy of the Product and immediately stop use of the Products if a negative reaction should arise.  You should always consult a licensed health care professional before starting any supplement, dietary, or exercise program, especially if you are pregnant or have any pre-existing injuries or medical conditions. The patient agrees that the [facility] and its affiliates and its [facility] (“[facility]”) are not liable for the patients use of the Products. [facility] makes no representations or warranties of any kind, expressed or implied, as to the Products, including,  but not limited to its efficacy, benefits or outcomes.  Patient agrees to contact his/her healthcare professional and stop use of Products should any reactions arise      ----------------------------------------------------------------------------------    3. Fosteum Plus (available from [facility])    Fosteum plus is a prescription medical food developed from natural ingredients.  It slows down the cells that breakdown bone and stimulates the production of more cells and there are activities that build bone.    Although uncommon, side effects may include minor nausea or upset stomach.  If this happens, you may take it with food.    Start with 1 capsule daily. If tolerated, increase to 1 capsule twice daily. If effective and you would like us to push a RX through to the mail order pharmacy (90 day supply), then please contact us through messaging or give us a call and we'll be happy to take care of that for you.     --------------------------------------------------------------------------------------    Have labs drawn 2 weeks before next follow-up.  Please go in the morning after fasting overnight. Hold your multivitamin 24hours prior.     Return in about 6 months (around 5/19/2025) for x60min: Osteopenia, low Vit D, Hyperlipidemia. Labs to be done just before next appt.    Patient affirmed understanding of plan and all questions were answered.     Heather Cunha PA-C

## 2024-11-19 NOTE — PATIENT INSTRUCTIONS
Vitamin D3 + K from [employer]    Provides 5,000 IU per softgel of vitamin D (as D3), designed for greater absorption. This high-potency formula also includes bioavailable forms of vitamin K2 (menaquinone-7) to complement vitamin D.    ?Suggested Use:  Take one softgel daily, then increase to 2 capsules daily for 2mos.  ------------------------------------------------------------------------------      [employer] - Multivitamin with plant based antioxidant support       Dose: 2  tabs daily with food, Avoid taking on empty stomach.      Where to Purchase: [email or web address]  This is our [facility] online dispensary for vitamins and supplements where you receive a 10% discount off of supplement prices! Select the \"Log In\" and then use your email address to complete creating your personal account. Please call [employer] at [phone], if you need direct help.     The products and items listed below (the “Products”)  and their claims have not been evaluated by the Food and Drug Administration. Dietary products are not intended to treat, prevent, mitigate or cure disease. Ultimately, you must draw your own conclusion as to the efficacy of the Product and immediately stop use of the Products if a negative reaction should arise.  You should always consult a licensed health care professional before starting any supplement, dietary, or exercise program, especially if you are pregnant or have any pre-existing injuries or medical conditions. The patient agrees that the [facility] and its affiliates and its [facility] (“[facility]”) are not liable for the patients use of the Products. [facility] makes no representations or warranties of any kind, expressed or implied, as to the Products, including, but not limited to its efficacy, benefits or outcomes.  Patient agrees to contact his/her healthcare professional and stop use of Products should any reactions  arise      ----------------------------------------------------------------------------------    3. Fosteum Plus (available from [facility])    Fosteum plus is a prescription medical food developed from natural ingredients.  It slows down the cells that breakdown bone and stimulates the production of more cells and there are activities that build bone.    Although uncommon, side effects may include minor nausea or upset stomach.  If this happens, you may take it with food.    Start with 1 capsule daily. If tolerated, increase to 1 capsule twice daily. If effective and you would like us to push a RX through to the mail order pharmacy (90 day supply), then please contact us through messaging or give us a call and we'll be happy to take care of that for you.     --------------------------------------------------------------------------------------    Have labs drawn 2 weeks before next follow-up.  Please go in the morning after fasting overnight. Hold your multivitamin 24hours prior.

## 2024-11-23 ENCOUNTER — HOSPITAL ENCOUNTER (OUTPATIENT)
Dept: CT IMAGING | Age: 64
Discharge: HOME | End: 2024-11-23
Attending: STUDENT IN AN ORGANIZED HEALTH CARE EDUCATION/TRAINING PROGRAM

## 2024-11-23 DIAGNOSIS — E78.00 HYPERCHOLESTEROLEMIA: ICD-10-CM

## 2024-12-16 DIAGNOSIS — I10 ESSENTIAL HYPERTENSION: ICD-10-CM

## 2024-12-16 NOTE — TELEPHONE ENCOUNTER
A refill request was received for:  Requested Prescriptions     Pending Prescriptions Disp Refills    LOSARTAN 25 MG Oral Tab [Pharmacy Med Name: LOSARTAN 25MG TABLETS] 90 tablet 0     Sig: TAKE 1 TABLET(25 MG) BY MOUTH DAILY     Last refill date:  9/9/24   Qty: 90 and 0   Dx: HTN  Last office visit: 8/29/24   When is follow up due: 2/28/25       Future Appointments   Date Time Provider Department Center   3/12/2025  8:20 AM [facility]   5/20/2025  1:30 PM Heather Cunha PA-C [facility] INT MED [facility]       
normal rate and rhythm, no chest pain and no edema.

## 2024-12-17 RX ORDER — LOSARTAN POTASSIUM 25 MG/1
25 TABLET ORAL DAILY
Qty: 90 TABLET | Refills: 0 | Status: SHIPPED | OUTPATIENT
Start: 2024-12-17

## 2024-12-24 DIAGNOSIS — F41.9 ANXIETY: ICD-10-CM

## 2024-12-24 RX ORDER — SERTRALINE HYDROCHLORIDE 100 MG/1
100 TABLET, FILM COATED ORAL EVERY MORNING
Qty: 90 TABLET | Refills: 0 | Status: SHIPPED | OUTPATIENT
Start: 2024-12-24

## 2024-12-24 NOTE — TELEPHONE ENCOUNTER
A refill request was received for:  Requested Prescriptions     Pending Prescriptions Disp Refills    sertraline 100 MG Oral Tab 90 tablet 0     Sig: Take 1 tablet (100 mg total) by mouth every morning.     Last refill date:  9/13/2024  Qty: 90 tablets and 0  Dx: anxiety  Last office visit: 8/29/2024  When is follow up due: 2/28/2025      Future Appointments   Date Time Provider Department Center   3/12/2025  8:20 AM Anisa Angulo MD [facility]  [facility]   5/20/2025  1:30 PM Heather Cunha, PAP[facility]CIOUS [facility] INT MED [facility] [place]

## 2024-12-30 ENCOUNTER — PATIENT MESSAGE (OUTPATIENT)
Dept: INTEGRATIVE MEDICINE | Facility: CLINIC | Age: 64
End: 2024-12-30

## 2024-12-30 RX ORDER — CALCIUM/PHOS/GENIST/D3/ZN/K 500MG-70MG
1 CAPSULE ORAL 2 TIMES DAILY
Qty: 180 CAPSULE | Refills: 3 | Status: SHIPPED | OUTPATIENT
Start: 2024-12-30

## 2025-02-02 DIAGNOSIS — R09.82 PND (POST-NASAL DRIP): ICD-10-CM

## 2025-02-03 RX ORDER — FLUTICASONE PROPIONATE 50 MCG
2 SPRAY, SUSPENSION (ML) NASAL DAILY
Qty: 16 G | Refills: 0 | Status: SHIPPED | OUTPATIENT
Start: 2025-02-03

## 2025-02-03 NOTE — TELEPHONE ENCOUNTER
A refill request was received for:  Requested Prescriptions     Pending Prescriptions Disp Refills    FLUTICASONE PROPIONATE 50 MCG/ACT Nasal Suspension [Pharmacy Med Name: FLUTICASONE 50MCG NASAL SP (120) RX] 16 g 0     Sig: SHAKE LIQUID AND USE 2 SPRAYS IN EACH NOSTRIL DAILY     Last refill date:  9/9/24   Qty: 16 g   Dx: PND   Last office visit:  8/29/24   When is follow up due: 2/28/25       Future Appointments   Date Time Provider Department Center   3/12/2025  8:20 AM Anisa Angulo MD [facility]  [facility]   5/20/2025  1:30 PM Heather Cunha, PA-C [facility] INT MED [facility] [place]

## 2025-03-12 ENCOUNTER — OFFICE VISIT (OUTPATIENT)
Dept: OBGYN CLINIC | Facility: CLINIC | Age: 65
End: 2025-03-12

## 2025-03-12 VITALS
WEIGHT: 156 LBS | SYSTOLIC BLOOD PRESSURE: 129 MMHG | HEART RATE: 63 BPM | DIASTOLIC BLOOD PRESSURE: 77 MMHG | BODY MASS INDEX: 26.63 KG/M2 | HEIGHT: 64 IN

## 2025-03-12 DIAGNOSIS — Z12.31 VISIT FOR SCREENING MAMMOGRAM: ICD-10-CM

## 2025-03-12 DIAGNOSIS — Z01.419 ENCOUNTER FOR GYNECOLOGICAL EXAMINATION WITHOUT ABNORMAL FINDING: Primary | ICD-10-CM

## 2025-03-12 DIAGNOSIS — N94.10 DYSPAREUNIA IN FEMALE: ICD-10-CM

## 2025-03-12 DIAGNOSIS — N95.1 VAGINAL DRYNESS, MENOPAUSAL: ICD-10-CM

## 2025-03-12 DIAGNOSIS — Z12.4 SCREENING FOR CERVICAL CANCER: ICD-10-CM

## 2025-03-12 PROCEDURE — 99212 OFFICE O/P EST SF 10 MIN: CPT | Performed by: OBSTETRICS & GYNECOLOGY

## 2025-03-12 PROCEDURE — 99386 PREV VISIT NEW AGE 40-64: CPT | Performed by: OBSTETRICS & GYNECOLOGY

## 2025-03-12 RX ORDER — ESTRADIOL 0.1 MG/G
CREAM VAGINAL
Qty: 42.5 G | Refills: 3 | Status: SHIPPED | OUTPATIENT
Start: 2025-03-12

## 2025-03-12 NOTE — PROGRESS NOTES
Nicky Araya is a 64 year old female  Patient's last menstrual period was 2006 (approximate). who presents for   Chief Complaint   Patient presents with    Gyn Exam     ANNUAL EXAM     Patient is new to me.  She complains of vaginal dryness since menopause with painful intercourse.  She had a DVT on ocps in her 20's.  We discussed the possibility of small dose of topical estrogen rwice weekly as serum levels are extremely low with this regimen and the benefits of the medication may outweigh the risks for her - it depends on the severity of the symptoms- patient is willing to try this after we discussed the small risk of DVT.      OBSTETRICS HISTORY:  OB History    Para Term  AB Living   2 2 2 0 0 2   SAB IAB Ectopic Multiple Live Births   0 0 0 0 0       GYNE HISTORY:        MEDICAL HISTORY:  Past Medical History:    Amenorrhea    Post menopause    Anxiety    Atypical squamous cell changes of undetermined significance favor benign (ASCUS favor benign)    Cerebral ventriculomegaly    yearly neurologist Dr. Savage at [facility]    Dyspareunia    Eczema    Extrinsic asthma, unspecified    Hematochezia    Hemorrhoids    HLA B27 (HLA B27 positive)    Hypertension    Nipple discharge    left, bloody skin fissure    PE (pulmonary embolism)    in the lungs - 5 years ago, attributed to BC    Radiculopathy    of lumbosacral spine, cervical spine    Spondylitis, ankylosing (HCC)    subclinical hyperthyroidism    normalized    Vitamin B 12 deficiency    oral replacement       SURGICAL HISTORY:  Past Surgical History:   Procedure Laterality Date    Colonoscopy,biopsy  2011    Colposcopy, cervix w/upper adjacent vagina; w/biopsy(s), cervix  2000    Hip replacement surgery  2014    Left Hip    Knee arthroscopy      age 21 right knee    Leep  2000    Steven biopsy stereo nodule 1 site left (cpt=19081)      benign    Other surgical history  2012    Excision of lipomatous  mass - left arm performed by Dr. Terrell at [facility]    Stereotactic breast biopsy  3/6/12 [facility]    Left breast    Tonsillectomy      age 5       SOCIAL HISTORY:  Social History     Socioeconomic History    Marital status:    Occupational History    Occupation: teacher   Tobacco Use    Smoking status: Never     Passive exposure: Never    Smokeless tobacco: Never   Vaping Use    Vaping status: Never Used   Substance and Sexual Activity    Alcohol use: Yes     Comment: 2 drinks per week    Drug use: No    Sexual activity: Yes     Partners: Male     Comment: menopause   Other Topics Concern    Blood Transfusions No    Caffeine Concern No    Stress Concern No    Weight Concern Yes    Special Diet No    Exercise Yes    Seat Belt No         FAMILY HISTORY:  Family History   Problem Relation Age of Onset    Breast Cancer Other     Other (hypothyroidism) Sister     Other (MS) Sister     Other (arhtritis) Mother     Hypertension Mother     Other (prostate ca) Father     Other (htn) Father     Other (MI) Father     Cancer Father         Prostrate    Hypertension Father     Stroke Other     Other (chronic migraines) Daughter     Breast Cancer Paternal Aunt 75       MEDICATIONS:    Current Outpatient Medications:     estradiol 0.1 MG/GM Vaginal Cream, 0.5 gm topically twice weekly, Disp: 42.5 g, Rfl: 3    FLUTICASONE PROPIONATE 50 MCG/ACT Nasal Suspension, SHAKE LIQUID AND USE 2 SPRAYS IN EACH NOSTRIL DAILY, Disp: 16 g, Rfl: 0    Dietary Management Product (FOSTEUM PLUS) Oral Cap, Take 1 capsule by mouth in the morning and 1 capsule before bedtime., Disp: 180 capsule, Rfl: 3    sertraline 100 MG Oral Tab, Take 1 tablet (100 mg total) by mouth every morning., Disp: 90 tablet, Rfl: 0    LOSARTAN 25 MG Oral Tab, TAKE 1 TABLET(25 MG) BY MOUTH DAILY, Disp: 90 tablet, Rfl: 0    busPIRone 5 MG Oral Tab, Take 1 tablet (5 mg total) by mouth 3 (three) times daily., Disp: 90 tablet, Rfl: 0    Upadacitinib ER 15 MG Oral  Tablet 24 Hr, Take 15 mg by mouth daily., Disp: , Rfl:     Azelastine HCl 137 MCG/SPRAY Nasal Solution, , Disp: , Rfl:     BREO ELLIPTA 100-25 MCG/ACT Inhalation Aerosol Powder, Breath Activated, Inhale 1 puff into the lungs daily., Disp: , Rfl:     Meloxicam 15 MG Oral Tab, Take 0.5-1 tablets (7.5-15 mg total) by mouth daily as needed., Disp: , Rfl:     PROAIR  (90 BASE) MCG/ACT Inhalation Aero Soln, , Disp: , Rfl: 3    ALLERGIES:  Allergies[1]      Review of Systems:  Constitutional:  Denies fatigue, night sweats, hot flashes  Eyes:  denies blurred or double vision  Cardiovascular:  denies chest pain or palpitations  Respiratory:  denies shortness of breath  Gastrointestinal:  denies heartburn, abdominal pain, diarrhea or constipation  Genitourinary:  denies dysuria, incontinence, abnormal vaginal discharge, vaginal itching  Musculoskeletal:  denies back pain.  Skin/Breast:  Denies any breast pain, lumps, or discharge.   Neurological:  denies headaches, extremity weakness or numbness.  Psychiatric: denies depression or anxiety.  Endocrine:   denies excessive thirst or urination.  Heme/Lymph:  denies history of anemia, easy bruising or bleeding.    Depression Screening (PHQ-2/PHQ-9): Over the LAST 2 WEEKS   Little interest or pleasure in doing things: Not at all    Feeling down, depressed, or hopeless: Several days    PHQ-2 SCORE: 1         Blood pressure 129/77, pulse 63, height 5' 4\" (1.626 m), weight 156 lb (70.8 kg), last menstrual period 05/16/2006.    PHYSICAL EXAM:   Constitutional: well developed, well nourished  Head/Face: normocephalic  Neck/Thyroid: thyroid symmetric, no thyromegaly, no nodules, no adenopathy  Lymphatic:no abnormal supraclavicular or axillary adenopathy is noted  Breast: normal without palpable masses, tenderness, asymmetry, nipple discharge or skin changes, + nipple inversion-known-chronic  Respiratory:  lungs clear to auscultation bilaterally  Cardiovascular: regular rate and  rhythm, no significant murmur  Abdomen:  soft, nontender, nondistended, no masses  Skin/Hair: no unusual rashes or bruises  Extremities: no edema, no cyanosis  Psychiatric:  Oriented to time, place, person and situation. Appropriate mood and affect    Pelvic Exam:  External Genitalia: normal appearance, hair distribution, and no lesions  Urethral Meatus:  normal in size, location, without lesions and prolapse  Bladder:  No fullness, masses or tenderness  Vagina:  Normal appearance without lesions, no abnormal discharge, atrophic changes c/w  menopause  Cervix:  Normal without tenderness on motion  Uterus: normal in size, contour, position, mobility, without tenderness  Adnexa: normal without masses or tenderness  Perineum: normal  Rectovaginal: no masses, normal tone  Anus: no thrombosed or ulcerated hemorroids    Assessment & Plan:   ASCCP guidelines discussed,cotest done,mammogram ordered,rtc 1 year for annual exam   SBE encouraged  Encounter Diagnoses   Name Primary?    Encounter for gynecological examination without abnormal finding Yes    Vaginal dryness, menopausal     Dyspareunia in female     Visit for screening mammogram      No orders of the defined types were placed in this encounter.    Requested Prescriptions     Signed Prescriptions Disp Refills    estradiol 0.1 MG/GM Vaginal Cream 42.5 g 3     Si.5 gm topically twice weekly     [facility] RSOE 2D+3D SCREENING BILAT (CPT=77067/64630)                [1]   Allergies  Allergen Reactions    Dust Mite Extract OTHER (SEE COMMENTS)     And dust mites    And dust mites   And dust mites    Levonorgestrel-Ethinyl Estrad OTHER (SEE COMMENTS)     Other reaction(s): Runny nose    Other reaction(s): Runny nose   Other reaction(s): OTHER (SEE COMMENTS)   Other reaction(s): Runny nose    Other WHEEZING    Codeine NAUSEA AND VOMITING    Bactrim      Rash    Cat Dander [Dander]     Dust      And dust mites    Seasonal Runny nose    Sulfa Antibiotics RASH     Sulfamethoxazole W/Trimethoprim RASH     Rash    Rash   Rash      Rash   Rash   Rash   Rash   Rash   Rash      Rash   Rash

## 2025-03-13 LAB — HPV E6+E7 MRNA CVX QL NAA+PROBE: NEGATIVE

## 2025-03-14 LAB — LAST PAP RESULT: NORMAL

## 2025-04-01 DIAGNOSIS — F41.9 ANXIETY: ICD-10-CM

## 2025-04-01 RX ORDER — SERTRALINE HYDROCHLORIDE 100 MG/1
100 TABLET, FILM COATED ORAL EVERY MORNING
Qty: 90 TABLET | Refills: 0 | Status: SHIPPED | OUTPATIENT
Start: 2025-04-01

## 2025-04-01 NOTE — TELEPHONE ENCOUNTER
A refill request was received for:  Requested Prescriptions     Pending Prescriptions Disp Refills    sertraline 100 MG Oral Tab 90 tablet 0     Sig: Take 1 tablet (100 mg total) by mouth every morning.     Last refill date:  12/24/2024  Qty: 90 tablets and 0  Dx: anxiety  Last office visit: 3/25/2025  When is follow up due: 5/6/2025      Future Appointments   Date Time Provider Department Center   5/20/2025  1:30 PM Heather Cunha PA-C [facility] INT MED [facility]   6/18/2025  8:40 AM [facility]

## 2025-04-11 ENCOUNTER — HOSPITAL ENCOUNTER (OUTPATIENT)
Age: 65
Discharge: HOME | End: 2025-04-11
Payer: COMMERCIAL

## 2025-04-11 VITALS
OXYGEN SATURATION: 98 % | TEMPERATURE: 98 F | RESPIRATION RATE: 18 BRPM | DIASTOLIC BLOOD PRESSURE: 81 MMHG | SYSTOLIC BLOOD PRESSURE: 143 MMHG | HEART RATE: 65 BPM

## 2025-04-11 DIAGNOSIS — H10.13 ALLERGIC CONJUNCTIVITIS OF BOTH EYES: ICD-10-CM

## 2025-04-11 DIAGNOSIS — B02.9 HERPES ZOSTER WITHOUT COMPLICATION: Primary | ICD-10-CM

## 2025-04-11 PROCEDURE — 99213 OFFICE O/P EST LOW 20 MIN: CPT | Performed by: PHYSICIAN ASSISTANT

## 2025-04-11 RX ORDER — VALACYCLOVIR HYDROCHLORIDE 1 G/1
1000 TABLET, FILM COATED ORAL 3 TIMES DAILY
Qty: 21 TABLET | Refills: 0 | Status: SHIPPED | OUTPATIENT
Start: 2025-04-11 | End: 2025-04-18

## 2025-04-11 RX ORDER — TETRACAINE HYDROCHLORIDE 5 MG/ML
1 SOLUTION OPHTHALMIC ONCE
Status: COMPLETED | OUTPATIENT
Start: 2025-04-11 | End: 2025-04-11

## 2025-04-11 NOTE — DISCHARGE INSTRUCTIONS
Please return to the ER/clinic if symptoms worsen. Follow-up with your PCP in 24-48 hours as needed.      Avoid touching or rubbing the eyes.  Do not touch the vesicular lesions to any other body parts.  Full course of Valtrex as prescribed.  Continue with your Allegra.  Recommend using over-the-counter lubricating drops for your eyes.  Also recommend not handling the cat etc.  If anything changes i.e. visual changes foreign body sensation etc. go directly to ophthalmology for further evaluation and treatment.  NOTE: Also recommend follow-up with optometry for new prescription.

## 2025-04-11 NOTE — ED INITIAL ASSESSMENT (HPI)
Previous hx of Shingles.  Developed rash w itching to r side of abd on Wednesday.  Now wrapping around to back & painful.  Right eye also feels irritated.

## 2025-04-11 NOTE — ED PROVIDER NOTES
Patient Seen in: [facility]    History     Chief Complaint   Patient presents with    Rash Skin Problem     Stated Complaint: shingles    Subjective:   HPI      64-year-old female here with complaint of itching to the right side of her abdomen that started on Wednesday now she has painful vesicular lesions.  Patient has had shingles in the past.  Patient wears glasses.  Patient reports that she has some irritation in her eye.  Patient denies visual changes or blurry vision.  Patient denies foreign body sensation.  Patient has allergies and took an Allegra today and states that she is allergic to cats and allowed her daughter's cat to sit on her chest.  Patient denies lip swelling throat itching or wheezing.  Patient denies chest pain, shortness of breath, cough, abdominal pain, nausea, vomiting or diarrhea.  Patient is tolerating p.o. speaking full sentences.  Afebrile.    Objective:     Past Medical History:    Amenorrhea    Post menopause    Anxiety    Atypical squamous cell changes of undetermined significance favor benign (ASCUS favor benign)    Cerebral ventriculomegaly    yearly neurologist Dr. Savage at [facility]    Dyspareunia    Eczema    Extrinsic asthma, unspecified    Hematochezia    Hemorrhoids    HLA B27 (HLA B27 positive)    Hypertension    Nipple discharge    left, bloody skin fissure    PE (pulmonary embolism)    in the lungs - 5 years ago, attributed to BC    Radiculopathy    of lumbosacral spine, cervical spine    Spondylitis, ankylosing (HCC)    subclinical hyperthyroidism    normalized    Vitamin B 12 deficiency    oral replacement            The patient's medication list, past medical history and social history elements  as listed in today's nurse's notes are reviewed and agree.   The patient's family history is reviewed and is noncontributory to the presenting problem, except as indicated as above.     Past Surgical History:   Procedure Laterality Date     Colonoscopy,biopsy  07/2011    Colposcopy, cervix w/upper adjacent vagina; w/biopsy(s), cervix  2000    Hip replacement surgery  02/2014    Left Hip    Knee arthroscopy      age 21 right knee    Leep  2000    Steven biopsy stereo nodule 1 site left (cpt=19081)  2012    benign    Other surgical history  09/26/2012    Excision of lipomatous mass - left arm performed by Dr. Terrell at [facility]    Stereotactic breast biopsy  3/6/12 [facility]    Left breast    Tonsillectomy      age 5                Social History     Socioeconomic History    Marital status:    Occupational History    Occupation: teacher   Tobacco Use    Smoking status: Never     Passive exposure: Never    Smokeless tobacco: Never   Vaping Use    Vaping status: Never Used   Substance and Sexual Activity    Alcohol use: Yes     Comment: 2 drinks per week    Drug use: No    Sexual activity: Yes     Partners: Male     Comment: menopause   Other Topics Concern    Blood Transfusions No    Caffeine Concern No    Stress Concern No    Weight Concern Yes    Special Diet No    Exercise Yes    Seat Belt No   Social History Narrative    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    2 children - aged 21 and 22 yo (July 2019)     -  diagnosed with early-onset Alzheimer's (7/2021)              Review of Systems    Positive for stated complaint: shingles  Other systems are as noted in HPI.  Constitutional and vital signs reviewed.      All other systems reviewed and negative except as noted above.    Physical Exam     ED Triage Vitals [04/11/25 0848]   /81   Pulse 65   Resp 18   Temp 97.8 °F (36.6 °C)   Temp src Oral   SpO2 98 %   O2 Device None (Room air)       Current Vitals:   Vital Signs  BP: 143/81  Pulse: 65  Resp: 18  Temp: 97.8 °F (36.6 °C)  Temp src: Oral    Oxygen Therapy  SpO2: 98 %  O2 Device: None (Room air)        Physical Exam  Vitals and nursing note reviewed.   Constitutional:       Appearance: Normal appearance. She is  well-developed.   HENT:      Head: Normocephalic.      Right Ear: External ear normal.      Left Ear: External ear normal.      Nose: Nose normal.      Mouth/Throat:      Mouth: Mucous membranes are moist.   Eyes:      General: Lids are normal.      Extraocular Movements: Extraocular movements intact.      Conjunctiva/sclera:      Right eye: Right conjunctiva is injected.      Left eye: Left conjunctiva is injected.      Pupils: Pupils are equal, round, and reactive to light.      Right eye: Fluorescein uptake present. No corneal abrasion. Katharina exam negative.      Comments: R eye: no dendrites noted   Cardiovascular:      Rate and Rhythm: Normal rate and regular rhythm.      Heart sounds: Normal heart sounds.   Pulmonary:      Effort: Pulmonary effort is normal.      Breath sounds: Normal breath sounds.   Musculoskeletal:      Cervical back: Normal range of motion and neck supple.   Skin:     General: Skin is warm.      Capillary Refill: Capillary refill takes less than 2 seconds.      Findings: Erythema and rash present. Rash is vesicular.      Comments: R flank: confluent vesiculat lesions noted   Neurological:      General: No focal deficit present.      Mental Status: She is alert and oriented to person, place, and time.   Psychiatric:         Mood and Affect: Mood normal.         Behavior: Behavior normal.         Thought Content: Thought content normal.         Judgment: Judgment normal.           ED Course        NOTE: R 20/40 L 20/50                         MDM       Clinical Impression: shingles/allergic conjunctivitis/allergies  Course of Treatment:   Avoid touching or rubbing the eyes.  Do not touch the vesicular lesions to any other body parts.  Full course of Valtrex as prescribed.  Continue with your Allegra.  Recommend using over-the-counter lubricating drops for your eyes.  Also recommend not handling the cat etc.  If anything changes i.e. visual changes foreign body sensation etc. go directly to  ophthalmology for further evaluation and treatment.  NOTE: Also recommend follow-up with optometry for new prescription.    The patient is encouraged to return if any concerning symptoms arise. Additional verbal discharge instructions are given and discussed. Discharge medications are discussed. The patient is in good condition throughout the visit today and remains so upon discharge. I discuss the plan of care with the patient, who expresses understanding. All questions and concerns are addressed to the patient's satisfaction prior to discharge today.  Previous conversations with PCP and charts were reviewed.            Disposition and Plan     Clinical Impression:  1. Herpes zoster without complication    2. Allergic conjunctivitis of both eyes         Disposition:  Discharge  4/11/2025  9:24 am    Follow-up:  Matthew Bishop MD  [address] [place] [address] [address]  [facility] [id number] [phone]          [address]  [facility] [address] [phone]              Medications Prescribed:  Current Discharge Medication List        START taking these medications    Details   valACYclovir 1 G Oral Tab Take 1 tablet (1,000 mg total) by mouth 3 (three) times daily for 7 days.  Qty: 21 tablet, Refills: 0             Supplementary Documentation:

## 2025-04-21 ENCOUNTER — PATIENT MESSAGE (OUTPATIENT)
Dept: FAMILY MEDICINE CLINIC | Facility: CLINIC | Age: 65
End: 2025-04-21

## 2025-04-22 DIAGNOSIS — I10 ESSENTIAL HYPERTENSION: ICD-10-CM

## 2025-04-22 RX ORDER — LOSARTAN POTASSIUM 25 MG/1
25 TABLET ORAL DAILY
Qty: 90 TABLET | Refills: 0 | Status: SHIPPED | OUTPATIENT
Start: 2025-04-22

## 2025-04-22 NOTE — TELEPHONE ENCOUNTER
A refill request was received for:  Requested Prescriptions     Pending Prescriptions Disp Refills    losartan 25 MG Oral Tab 90 tablet 0     Sig: Take 1 tablet (25 mg total) by mouth daily.     Last refill date:  12/17/2024  Qty: 90 tablets and 0  Dx: hypertension  Last office visit: 3/25/2025  When is follow up due: 5/6/2025      Future Appointments   Date Time Provider Department Center   5/14/2025  8:15 PM  MR RM2 (1.5T WIDE) [facility]   5/20/2025  1:30 PM Heather Cunha PA-C [facility]   6/18/2025  8:40 AM [facility]

## 2025-05-14 ENCOUNTER — HOSPITAL ENCOUNTER (OUTPATIENT)
Dept: MRI IMAGING | Facility: HOSPITAL | Age: 65
Discharge: HOME | End: 2025-05-14
Attending: STUDENT IN AN ORGANIZED HEALTH CARE EDUCATION/TRAINING PROGRAM
Payer: COMMERCIAL

## 2025-05-14 DIAGNOSIS — M43.9 COMPRESSION DEFORMITY OF VERTEBRA: ICD-10-CM

## 2025-05-14 PROCEDURE — 72146 MRI CHEST SPINE W/O DYE: CPT | Performed by: STUDENT IN AN ORGANIZED HEALTH CARE EDUCATION/TRAINING PROGRAM

## 2025-05-15 PROBLEM — G95.0 SYRINX (HCC): Status: ACTIVE | Noted: 2025-05-15

## 2025-05-20 ENCOUNTER — OFFICE VISIT (OUTPATIENT)
Dept: INTEGRATIVE MEDICINE | Facility: CLINIC | Age: 65
End: 2025-05-20
Payer: COMMERCIAL

## 2025-05-20 VITALS
OXYGEN SATURATION: 99 % | BODY MASS INDEX: 26.94 KG/M2 | DIASTOLIC BLOOD PRESSURE: 72 MMHG | TEMPERATURE: 98 F | HEIGHT: 64 IN | HEART RATE: 76 BPM | SYSTOLIC BLOOD PRESSURE: 128 MMHG | WEIGHT: 157.81 LBS

## 2025-05-20 DIAGNOSIS — Z83.49 FAMILY HISTORY OF MTHFR DEFICIENCY: ICD-10-CM

## 2025-05-20 DIAGNOSIS — M85.80 OSTEOPENIA, UNSPECIFIED LOCATION: ICD-10-CM

## 2025-05-20 DIAGNOSIS — E53.8 LOW SERUM VITAMIN B12: ICD-10-CM

## 2025-05-20 DIAGNOSIS — M45.9 ANKYLOSING SPONDYLITIS, UNSPECIFIED SITE OF SPINE (HCC): ICD-10-CM

## 2025-05-20 DIAGNOSIS — E53.8 LOW FOLATE: ICD-10-CM

## 2025-05-20 DIAGNOSIS — E78.5 HYPERLIPIDEMIA, UNSPECIFIED HYPERLIPIDEMIA TYPE: Primary | ICD-10-CM

## 2025-05-20 PROCEDURE — 99214 OFFICE O/P EST MOD 30 MIN: CPT | Performed by: PHYSICIAN ASSISTANT

## 2025-05-20 NOTE — PATIENT INSTRUCTIONS
Get labs done, fasting in the morning. Hold the vitamins 2 days prior to the lab draw  Increase Fosteum Plus to 1 cap BID

## 2025-05-20 NOTE — PROGRESS NOTES
Nicky Araya is a 64 year old female.  Chief Complaint   Patient presents with    Follow - Up       HPI:   65yo female patient with known h/o ankylosing spondylitis and HTN presents today to discuss hypercholesterolemia.    History of hypertension  Hyperlipidemia - lipids run in Jan at [facility] and improved on BBR complete!  Heart CT 11/24/24 - 0 coronary calcium score  (Incidentally, found compression fx/cyst of t-spine and to see neurosurgeon)    Significantly low Vit D  Does not eat/drink dairy  + Osteopenia on 2021 DEXA scan - Rheumatologist has an order for her in to get new one  Tried the Fosteum Plus and was wondering if it could cause constipation. Also had increased stress at the time.     Saw Ernestina at [facility] practice and made some changes to diet that were very helpful and did start working out. Keeping calories under 1300 calories.     Anxiety better - started a mediation and doing better  High stress the last few months, lost father and put house up for sale    Daughter has +MTHFR snp - they also both have the HLA B27     --------------------------------------------Last OV---------------------------------------------------    Started a new med Evoq for the A.S. x6mos ago, which is working, but suspects it increased her LDL. Pharmacist confirmed it can impact cholesterol because it is a YAZAN inhibitor.    Just made an appt with [facility] to balance diets for both her and her  (EOE + Alzheimers).    Has started the BBR complete.     CT Heart scheduled for Sat night    Menopause was in 40s   Had a PE on OCP 2006 at 45yo so never thought she could be on HRT    Significantly low Vit D  Does not eat/drink dairy  + Osteopenia on 2021 DEXA scan  ------------------------------  Tot/HDL - 2.53  ASCVD - 5.3  To get the CT Heart soon    Got off all the supplements recently  Started AG1  Started the [facility] protocol      Just retired from teaching math at [employer]    REVIEW OF SYSTEMS:   Review of  Systems     Negative except for pertinent ROS in HPI      FAMILY HISTORY:      Family History   Problem Relation Age of Onset    Breast Cancer Other     Other (hypothyroidism) Sister     Other (MS) Sister     Other (arhtritis) Mother     Hypertension Mother     Other (prostate ca) Father     Other (htn) Father     Other (MI) Father     Cancer Father         Prostrate    Hypertension Father     Stroke Other     Other (chronic migraines) Daughter     Breast Cancer Paternal Aunt 75       MEDICAL HISTORY:     Past Medical History:    Amenorrhea    Post menopause    Anxiety    Atypical squamous cell changes of undetermined significance favor benign (ASCUS favor benign)    Cerebral ventriculomegaly    yearly neurologist Dr. Savage at [facility]    Dyspareunia    Eczema    Extrinsic asthma, unspecified    Hematochezia    Hemorrhoids    HLA B27 (HLA B27 positive)    Hypertension    Nipple discharge    left, bloody skin fissure    PE (pulmonary embolism)    in the lungs - 5 years ago, attributed to BC    Radiculopathy    of lumbosacral spine, cervical spine    Spondylitis, ankylosing (HCC)    subclinical hyperthyroidism    normalized    Vitamin B 12 deficiency    oral replacement       CURRENT MEDICATIONS:     Current Outpatient Medications   Medication Sig Dispense Refill    losartan 25 MG Oral Tab Take 1 tablet (25 mg total) by mouth daily. 90 tablet 0    sertraline 100 MG Oral Tab Take 1 tablet (100 mg total) by mouth every morning. 90 tablet 0    busPIRone 5 MG Oral Tab Take 1 tablet (5 mg total) by mouth 2 (two) times daily. 180 tablet 0    estradiol 0.1 MG/GM Vaginal Cream 0.5 gm topically twice weekly 42.5 g 3    FLUTICASONE PROPIONATE 50 MCG/ACT Nasal Suspension SHAKE LIQUID AND USE 2 SPRAYS IN EACH NOSTRIL DAILY 16 g 0    Dietary Management Product (FOSTEUM PLUS) Oral Cap Take 1 capsule by mouth in the morning and 1 capsule before bedtime. 180 capsule 3    Upadacitinib ER 15 MG Oral Tablet 24 Hr Take  15 mg by mouth daily.      Azelastine HCl 137 MCG/SPRAY Nasal Solution       BREO ELLIPTA 100-25 MCG/ACT Inhalation Aerosol Powder, Breath Activated Inhale 1 puff into the lungs daily.      Meloxicam 15 MG Oral Tab Take 0.5-1 tablets (7.5-15 mg total) by mouth daily as needed.      PROAIR  (90 BASE) MCG/ACT Inhalation Aero Soln   3       SOCIAL HISTORY:   Lifestyle Factors affecting health:    Diet - Started Wahls; has been prone to grab cookies/packaged goods  Typically a stress eater  Has started the Zevia instead of the diet coke     Exercise - walking (10k) and wt training at [employer], biking    Stress - moderate - but better with half-way.   diagnosed with Alzheimers    Sleep - good    Social History     Socioeconomic History    Marital status:    Occupational History    Occupation: teacher   Tobacco Use    Smoking status: Never     Passive exposure: Never    Smokeless tobacco: Never   Vaping Use    Vaping status: Never Used   Substance and Sexual Activity    Alcohol use: Yes     Comment: 2 drinks per week    Drug use: No    Sexual activity: Yes     Partners: Male     Comment: menopause   Other Topics Concern    Blood Transfusions No    Caffeine Concern No    Stress Concern No    Weight Concern Yes    Special Diet No    Exercise Yes    Seat Belt No   Social History Narrative    Teacher - Math in [employer].    2 children - aged 21 and 22 yo (July 2019)     -  diagnosed with early-onset Alzheimer's (7/2021)       SURGICAL HISTORY:     Past Surgical History:   Procedure Laterality Date    Colonoscopy,biopsy  07/2011    Colposcopy, cervix w/upper adjacent vagina; w/biopsy(s), cervix  2000    Hip replacement surgery  02/2014    Left Hip    Knee arthroscopy      age 21 right knee    Leep  2000    Steven biopsy stereo nodule 1 site left (cpt=19081)  2012    benign    Other surgical history  09/26/2012    Excision of lipomatous mass - left arm performed by Dr. Terrell at [facility]     Stereotactic breast biopsy  3/6/12 [facility]    Left breast    Tonsillectomy      age 5       PHYSICAL EXAM:     Vitals:    05/20/25 1336   BP: 128/72   BP Location: Right arm   Patient Position: Sitting   Cuff Size: adult   Pulse: 76   Temp: 98 °F (36.7 °C)   TempSrc: Oral   SpO2: 99%   Weight: 157 lb 12.8 oz (71.6 kg)   Height: 5' 4\" (1.626 m)         Physical Exam  Constitutional:       General: She is not in acute distress.     Appearance: Normal appearance. She is not ill-appearing or toxic-appearing.   HENT:      Head: Normocephalic and atraumatic.   Eyes:      Extraocular Movements: Extraocular movements intact.   Pulmonary:      Effort: Pulmonary effort is normal. No respiratory distress.   Musculoskeletal:      Cervical back: Normal range of motion.   Skin:     Coloration: Skin is not jaundiced.      Findings: No lesion.   Neurological:      Mental Status: She is alert and oriented to person, place, and time.   Psychiatric:         Mood and Affect: Mood normal.         Behavior: Behavior normal.         Thought Content: Thought content normal.         Judgment: Judgment normal.          ASSESSMENT AND PLAN:     Office Visit on 03/12/2025   Component Date Value Ref Range Status    HPV High Risk 03/12/2025 Negative  Negative Final    HPV Source 03/12/2025 Cervical/endocervical   Final    Interpretation/Result 03/12/2025 Negative for intraepithelial lesion or malignancy  Negative for intraepithelial lesion or malignancy Final    Specimen Adequacy 03/12/2025 Satisfactory for evaluation.  Endocervical or metaplastic cells may not be distinguished in cases of atrophy.   Final    General Categorization 03/12/2025 Negative for intraepithelial lesion or malignancy     Final    HPV High Risk mRNA 03/12/2025    Final                    Value:Negative  Normal        Recommendations/Comments 03/12/2025    Final                    Value:Screened by the Thinprep Imaging System and a cytotechnologist.      Procedure  03/12/2025    Final                    Value:Monolayers:  1, specimen collected in Thinprep vial, 20 ml Preservcyt      Clinical Information 03/12/2025    Final                    Value:Z01.419 Encounter For Gynecological Examination Without Abnormal Finding.  Z12.4 Screening For Cervical Cancer.         Reason for testing 03/12/2025 Screening   Final    Last PAP Result 03/12/2025 Normal   Final    Last Pap Date 03/12/2025    Final                    Value:7/2/2021    Menstrual Status 03/12/2025 Post-Menopausal   Final    Gyn Additional Information 03/12/2025    Final                    Value:NOTE:  The Pap smear is a screening test that aids in the detection of cervical cancer and its precursors.  False negative and false positive results can occur. Regular sampling is recommended to minimize false negative results.      Case Report 03/12/2025    Final                    Value:Gynecologic Cytology                              Case: [id number]                                  Authorizing Provider:  Anisa Angulo MD        Collected:           03/12/2025 12:37 PM          Ordering Location:     [facility]    Received:            03/13/2025 02:59 PM                                 Group, [place] [facility],                                                                             [place] - [facility]                                                            First Screen:          Sid Wolfe                                                                Specimen:    ThinPrep Imager Screening Pap, Cervical/endocervical                                         MRI SPINE THORACIC (CPT=72146)  Result Date: 5/15/2025  CONCLUSION:  1. 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 Hyperlipidemia, unspecified hyperlipidemia type  - Homocysteine; Future    2.  Low serum vitamin B12  - Homocysteine; Future    3. Osteopenia, unspecified location    4. Ankylosing spondylitis, unspecified site of spine (HCC)    5. Family history of MTHFR deficiency  - Homocysteine; Future    6. Low folate  - Homocysteine; Future  - Folic Acid Serum (Folate); Future          Time spent with patient: Over 35 minutes spent in chart review and in direct communication with patient obtaining and reviewing history, creating a unique care plan, explaining the rationale for treatment, reviewing potential SE and overall treatment plan,  documenting all clinical information in Epic. Over 50% of this time was in education, counseling and coordination of care.     Problem List Items Addressed This Visit          HCC Problems    Spondylitis, ankylosing (HCC)       Endocrine and Metabolic    Osteopenia     Other Visit Diagnoses         Hyperlipidemia, unspecified hyperlipidemia type    -  Primary    Relevant Orders    Homocysteine      Low serum vitamin B12        Relevant Orders    Homocysteine      Family history of MTHFR deficiency        Relevant Orders    Homocysteine      Low folate        Relevant Orders    Homocysteine    Folic Acid Serum (Folate)               Cardiometabolic:  Hemoglobin A1c not run yet  History of hypertension -now stable  Hyperlipidemia -LDL improved in last draw from [facility] after starting the BBR Complete. Some flushing briefly with taking.     heart CT - cor ca score 0  -> Plans to see nutritionist again.   -> h/o LP(a) low and Apolipoprotein B elevated  -> Continue BBR complete to support cholesterol and blood sugar  -> to get labs soon to check other markers.     Osteopenia per DEXA scan in 2021  -Was never on HRT  -> Continue Fosteum plus   -Significantly low vitamin D->to recheck  -> Multivitamin and mineral support through [facility] (low B vitamins as well)  ->Rheum ordered a new DEXA for her.     Given further recommendations as below    Orders Placed This Visit:  Orders  Placed This Encounter   Procedures    Homocysteine    Folic Acid Serum (Folate)     No orders of the defined types were placed in this encounter.      Patient Instructions   Get labs done, fasting in the morning. Hold the vitamins 2 days prior to the lab draw  Increase Fosteum Plus to 1 cap BID      No follow-ups on file.    Patient affirmed understanding of plan and all questions were answered.     Heather Cunha PA-C

## 2025-06-05 ENCOUNTER — OFFICE VISIT (OUTPATIENT)
Dept: SURGERY | Facility: CLINIC | Age: 65
End: 2025-06-05
Payer: COMMERCIAL

## 2025-06-05 VITALS
HEART RATE: 70 BPM | WEIGHT: 157 LBS | BODY MASS INDEX: 27 KG/M2 | SYSTOLIC BLOOD PRESSURE: 136 MMHG | DIASTOLIC BLOOD PRESSURE: 76 MMHG

## 2025-06-05 DIAGNOSIS — M47.812 CERVICAL SPONDYLOSIS: ICD-10-CM

## 2025-06-05 DIAGNOSIS — G95.0 SYRINX (HCC): ICD-10-CM

## 2025-06-05 DIAGNOSIS — M45.9 ANKYLOSING SPONDYLITIS, UNSPECIFIED SITE OF SPINE (HCC): ICD-10-CM

## 2025-06-05 DIAGNOSIS — G93.89 CEREBRAL VENTRICULOMEGALY: ICD-10-CM

## 2025-06-05 DIAGNOSIS — G93.5 CHIARI MALFORMATION TYPE I (HCC): Primary | ICD-10-CM

## 2025-06-05 DIAGNOSIS — M54.12 CERVICAL RADICULOPATHY: ICD-10-CM

## 2025-06-05 PROCEDURE — 99205 OFFICE O/P NEW HI 60 MIN: CPT | Performed by: STUDENT IN AN ORGANIZED HEALTH CARE EDUCATION/TRAINING PROGRAM

## 2025-06-05 NOTE — H&P
[facility]  Neurological Surgery New Patient Clinic Note    Nicky Araya  12/18/1960  [id number]  PCP: Matthew Bishop MD  Referring Provider: Matthew Bishop MD    REASON FOR VISIT:  Evaluation of incidental thoracic “syrinx”/wedge deformity    HISTORY OF PRESENT ILLNESS 6/5/2025:  Nicky Araya is a(n) 64 year old female referred after a November 2024 cardiac CT unexpectedly described a mid-thoracic wedge deformity; a follow-up thoracic MRI on 5/14/2025 then labeled a 3.8 cm “syrinx.” She denies antecedent trauma, acute back pain, radicular pain, bowel/bladder change, or myelopathic symptoms. She recalls a severe cough with COVID one year prior but no discrete injury. She reports chronic mechanical low-back/hip discomfort and episodic upper-extremity tingling she attributes to cervical degenerative disease. No occipital/valsalva headaches, exertional syncope, cape-like sensory change, disequilibrium, or incontinence. Past ankylosing spondylitis treated with upadacitinib; reports improved but still limited spinal flexibility. Concern today is understanding significance of the imaging findings and preventing future neurologic decline.    PAST MEDICAL HISTORY:  Past Medical History[1]    PAST SURGICAL HISTORY:  Past Surgical History[2]    FAMILY HISTORY:  family history includes Breast Cancer in an other family member; Breast Cancer (age of onset: 75) in her paternal aunt; Cancer in her father; Hypertension in her father and mother; MI in her father; MS in her sister; Stroke in an other family member; arhtritis in her mother; chronic migraines in her daughter; htn in her father; hypothyroidism in her sister; prostate ca in her father.    SOCIAL HISTORY:   reports that she has never smoked. She has never been exposed to tobacco smoke. She has never used smokeless tobacco. She reports current alcohol use. She reports that she does not use  drugs.    ALLERGIES:  Allergies[3]    MEDICATIONS:  Medications Ordered Prior to Encounter[4]    REVIEW OF SYSTEMS:  All other systems were reviewed and were negative except for those previously mentioned in the HPI    PHYSICAL EXAMINATION:  General: No acute distress.  Respiratory: Non-labored respirations bilaterally. No audible wheezing  Cardiovascular: Extremities warm and well-perfused.  Abdomen: Soft, nontender, nondistended.   Musculoskeletal: Moves all extremities well, symmetrically.  Extremities: No edema.    NEUROLOGIC EXAMINATION:  Mental status: Alert and oriented x 3  Speech: Clear, fluent  Cranial nerves: PERRLA, EOMI, face symmetric, with normal strength and sensation, tongue and palate midline, SCM 5/5 bilaterally  Motor:     RIGHT  Delt 5/5   Bic 5/5  Tri 5/5   HI 5/5    5/5  IP 5/5   Quad 5/5   Ham 5/5   AT 5/5   EHL 5/5 Nahun 5/5     LEFT    Delt 5/5   Bic 5/5  Tri 5/5   HI 5/5    5/5  IP 5/5   Quad 5/5   Ham 5/5   AT 5/5   EHL 5/5 Nahun 5/5   No pronator drift  Tone: Normal  Atrophy/Fasciculations: None  Sensation: Normal to light touch, symmetric, no neglect  Cerebellar: Normal finger nose finger  Gait: Normal, nondistressed heel toe tandem gait      Reflexes: 2+ throughout, symmetric, no Martínez's    IMAGING:  MR brain 2/4/2012: Ventriculomegaly with diffuse white-matter atrophy; cerebellar tonsillar descent ~7 mm below foramen magnum--meets radiographic Chiari I criteria but without syrinx or CSF obstruction at that time.    CT calcium scoring 11/23/2024: Mild anterior wedge configuration mid-thoracic vertebra, unchanged, likely chronic sequela of ankylosing spondylitis kyphosis rather than acute compression fracture; lungs and mediastinum normal.    MR thoracic 5/14/2025: Well-aligned vertebrae without acute fracture. Central canal slightly prominent T4-T8 (max ~3 mm diameter, 3.8 cm length) without spinal cord expansion, edema, or signal change--favored dilated central canal rather  than pathologic syrinx. No disc herniation, stenosis, or foraminal narrowing.      ASSESSMENT:  Ms. 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 Thoracic vertebral wedging appears chronic and attributable to her ankylotic kyphosis rather than acute compression fracture. She is neurologically intact and asymptomatic; current risk is progression to a symptomatic syrinx or instability fracture given her spondylitis. Prognosis favorable with monitoring and bone-health optimization.    PLAN:  - Order MRI cervical spine with and without contrast in 3 months to monitor central canal size and screen for true syrinx.  - Educate patient on Chiari red-flag symptoms (occipital cough headache, new paresthesias, imbalance); instruct to report changes promptly.  -  on fracture avoidance; maintain anti-inflammatory therapy per rheumatology.  - Continue current medications; optimize BP and weight-bearing exercise as tolerated.  - Follow-up neurosurgery clinic after cervical MRI (target September 2025).    Doug Nye MD  Neurological Surgery    [facility] [address], [address] [place], [place] [id number] [phone]  Pager [phone]  6/5/2025 2:33 PM      This note was created using a voice-recognition transcribing system. Incorrect words or phrases may have been missed during proofreading. Please interpret accordingly.    Total Time    New Patient Total Time       60  minutes.      Activities       Preparing to see the patient (chart/tests/imaging review).       Obtaining and/or reviewing separately obtained history.       Performing a medically appropriate examination and/or evaluation.        Counseling and educating the patient/family/caregiver.       Ordering medications, tests, or procedures.       Referring and communicating with other health care professionals (when not separately reported).       Documenting clincal information in the electronic or other health record.       Independently interpreting results (not separately reported).    Communicating results to the patient/family/caregiver.    Care coordination (not separately reported).         [1]   Past Medical History:   Amenorrhea    Post menopause    Anxiety    Atypical squamous cell changes of undetermined significance favor benign (ASCUS favor benign)    Cerebral ventriculomegaly    yearly neurologist Dr. Savage at [facility]    Dyspareunia    Eczema    Extrinsic asthma, unspecified    Hematochezia    Hemorrhoids    HLA B27 (HLA B27 positive)    Hypertension    Nipple discharge    left, bloody skin fissure    PE (pulmonary embolism)    in the lungs - 5 years ago, attributed to BC    Radiculopathy    of lumbosacral spine, cervical spine    Spondylitis, ankylosing (HCC)    subclinical hyperthyroidism    normalized    Vitamin B 12 deficiency    oral replacement   [2]   Past Surgical History:  Procedure Laterality Date    Colonoscopy,biopsy  07/2011    Colposcopy, cervix w/upper adjacent vagina; w/biopsy(s), cervix  2000    Hip replacement surgery  02/2014    Left Hip    Knee arthroscopy      age 21 right knee    Leep  2000    Steven biopsy stereo nodule 1 site left (cpt=19081)  2012    benign    Other surgical history  09/26/2012    Excision of lipomatous mass - left arm performed by Dr. Terrell at [facility]    Stereotactic breast biopsy  3/6/12 [facility]    Left breast    Tonsillectomy      age 5   [3]   Allergies  Allergen Reactions    Dust Mite Extract OTHER (SEE COMMENTS)     And dust mites    And dust mites   And dust mites    Levonorgestrel-Ethinyl Estrad OTHER (SEE COMMENTS)     Other reaction(s): Runny  nose    Other reaction(s): Runny nose   Other reaction(s): OTHER (SEE COMMENTS)   Other reaction(s): Runny nose    Other WHEEZING    Codeine NAUSEA AND VOMITING    Bactrim      Rash    Cat Dander [Dander]     Dust      And dust mites    Seasonal Runny nose    Sulfa Antibiotics RASH    Sulfamethoxazole W/Trimethoprim RASH     Rash    Rash   Rash      Rash   Rash   Rash   Rash   Rash   Rash      Rash   Rash   [4]   Current Outpatient Medications on File Prior to Visit   Medication Sig Dispense Refill    losartan 25 MG Oral Tab Take 1 tablet (25 mg total) by mouth daily. 90 tablet 0    sertraline 100 MG Oral Tab Take 1 tablet (100 mg total) by mouth every morning. 90 tablet 0    busPIRone 5 MG Oral Tab Take 1 tablet (5 mg total) by mouth 2 (two) times daily. 180 tablet 0    estradiol 0.1 MG/GM Vaginal Cream 0.5 gm topically twice weekly 42.5 g 3    FLUTICASONE PROPIONATE 50 MCG/ACT Nasal Suspension SHAKE LIQUID AND USE 2 SPRAYS IN EACH NOSTRIL DAILY 16 g 0    Dietary Management Product (FOSTEUM PLUS) Oral Cap Take 1 capsule by mouth in the morning and 1 capsule before bedtime. 180 capsule 3    Upadacitinib ER 15 MG Oral Tablet 24 Hr Take 15 mg by mouth daily.      Azelastine HCl 137 MCG/SPRAY Nasal Solution       BREO ELLIPTA 100-25 MCG/ACT Inhalation Aerosol Powder, Breath Activated Inhale 1 puff into the lungs daily.      Meloxicam 15 MG Oral Tab Take 0.5-1 tablets (7.5-15 mg total) by mouth daily as needed.      PROAIR  (90 BASE) MCG/ACT Inhalation Aero Soln   3     No current facility-administered medications on file prior to visit.

## 2025-06-05 NOTE — PROGRESS NOTES
New patient presents for incidental syrinx finding on recent thoracic MRI dated 5/14/25. Patient also has h/o ankylosing spondylitis and osteopenia. Patient is on Rinvoq. Last DEXA 2021- on Foesteum plus. New DEXA scan pending.     Admits to occasional N/T in the legs/ lower back. Sometimes she gets pain in the lower back. Thoracic fracture end of 2024- no onset injury    The following individual(s) verbally consented to be recorded using ambient AI listening technology and understand that they can each withdraw their consent to this listening technology at any point by asking the clinician to turn off or pause the recording:    Patient name: Nicky Araya

## 2025-06-27 ENCOUNTER — APPOINTMENT (OUTPATIENT)
Dept: GENERAL RADIOLOGY | Age: 65
End: 2025-06-27
Payer: COMMERCIAL

## 2025-06-27 ENCOUNTER — HOSPITAL ENCOUNTER (OUTPATIENT)
Age: 65
Discharge: HOME | End: 2025-06-27
Payer: COMMERCIAL

## 2025-06-27 VITALS
SYSTOLIC BLOOD PRESSURE: 140 MMHG | HEART RATE: 95 BPM | DIASTOLIC BLOOD PRESSURE: 86 MMHG | TEMPERATURE: 98 F | OXYGEN SATURATION: 97 % | RESPIRATION RATE: 18 BRPM

## 2025-06-27 DIAGNOSIS — S76.011A STRAIN OF MUSCLE OF RIGHT HIP, INITIAL ENCOUNTER: ICD-10-CM

## 2025-06-27 DIAGNOSIS — M25.551 PAIN OF RIGHT HIP: Primary | ICD-10-CM

## 2025-06-27 DIAGNOSIS — M54.50 LOW BACK PAIN AT MULTIPLE SITES: ICD-10-CM

## 2025-06-27 PROCEDURE — 99213 OFFICE O/P EST LOW 20 MIN: CPT

## 2025-06-27 PROCEDURE — 73502 X-RAY EXAM HIP UNI 2-3 VIEWS: CPT

## 2025-06-27 RX ORDER — CYCLOBENZAPRINE HCL 10 MG
10 TABLET ORAL NIGHTLY
Qty: 7 TABLET | Refills: 0 | Status: SHIPPED | OUTPATIENT
Start: 2025-06-27 | End: 2025-07-04

## 2025-06-27 RX ORDER — IBUPROFEN 600 MG/1
600 TABLET, FILM COATED ORAL EVERY 8 HOURS PRN
Qty: 21 TABLET | Refills: 0 | Status: SHIPPED | OUTPATIENT
Start: 2025-06-27 | End: 2025-07-04

## 2025-06-27 RX ORDER — LIDOCAINE 4 G/G
1 PATCH TOPICAL EVERY 24 HOURS
Qty: 7 PATCH | Refills: 0 | Status: SHIPPED | OUTPATIENT
Start: 2025-06-27 | End: 2025-07-04

## 2025-06-27 NOTE — ED INITIAL ASSESSMENT (HPI)
Patient states did an exercise class Weds.  Noticed right hip discomfort  Weight bearing triggers discomfort

## 2025-06-28 NOTE — ED PROVIDER NOTES
History     Chief Complaint   Patient presents with    Back Pain       Subjective:   HPI    Nicky Araya, 64 year old female with notable medical history of asthma, depression, anxiety, ankylosing spondylitis, hypertension, OA, osteopenia, syrinx who presents with right hip pain.  Patient reports for the last week she has been doing heavy lifting as she is packing and moving boxes.  She reports that on Wednesday she did a lifetime class that required a lot of stepping.  She denies any injury or trauma.  She denies any numbness or tingling.  She denies any saddle paresthesias.  She denies any bowel or bladder incontinence.  She denies any urinary symptoms.  She reports minor low back pain associated with the hip pain.  She denies any fevers, chills.  Has taken 400 mg of ibuprofen which has helped. Has had a history of bursitis in the hip in the past has completed physical therapy previously.      Problem List[1]   Objective:   Past Medical History:    Amenorrhea    Post menopause    Anxiety    Atypical squamous cell changes of undetermined significance favor benign (ASCUS favor benign)    Cerebral ventriculomegaly    yearly neurologist Dr. Savage at [facility]    Depression    Dyspareunia    Eczema    Extrinsic asthma, unspecified    Hematochezia    Hemorrhoids    HLA B27 (HLA B27 positive)    Hypertension    Nipple discharge    left, bloody skin fissure    PE (pulmonary embolism)    in the lungs - 5 years ago, attributed to BC    Radiculopathy    of lumbosacral spine, cervical spine    Spondylitis, ankylosing (HCC)    subclinical hyperthyroidism    normalized    Vitamin B 12 deficiency    oral replacement              Past Surgical History:   Procedure Laterality Date    Colonoscopy,biopsy  07/2011    Colposcopy, cervix w/upper adjacent vagina; w/biopsy(s), cervix  2000    Hip replacement surgery  02/2014    Left Hip    Knee arthroscopy      age 21 right knee    Leep  2000    Steven biopsy stereo nodule 1 site  left (cpt=19081)  2012    benign    Other surgical history  09/26/2012    Excision of lipomatous mass - left arm performed by Dr. Terrell at [facility]    Stereotactic breast biopsy  3/6/12 [facility]    Left breast    Tonsillectomy      age 5                Social History     Socioeconomic History    Marital status:    Occupational History    Occupation: teacher   Tobacco Use    Smoking status: Never     Passive exposure: Never    Smokeless tobacco: Never   Vaping Use    Vaping status: Never Used   Substance and Sexual Activity    Alcohol use: Yes     Comment: 2 drinks per week    Drug use: No    Sexual activity: Yes     Partners: Male     Comment: menopause   Other Topics Concern    Blood Transfusions No    Caffeine Concern No    Stress Concern No    Weight Concern Yes    Special Diet No    Exercise Yes    Seat Belt No   Social History Narrative    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    2 children - aged 21 and 24 yo (July 2019)     -  diagnosed with early-onset Alzheimer's (7/2021)              Medications Ordered Prior to Encounter[2]      Constitutional and vital signs reviewed.      All other systems reviewed and negative except as noted above.    I have reviewed the family history, social history, allergies, and outpatient medications.     History reviewed from EMR: Encounters, problem list, allergies, medications      Physical Exam     ED Triage Vitals [06/27/25 1813]   /86   Pulse 95   Resp 18   Temp 98.3 °F (36.8 °C)   Temp src    SpO2 97 %   O2 Device None (Room air)       Current:/86   Pulse 95   Temp 98.3 °F (36.8 °C)   Resp 18   LMP 05/16/2006 (Approximate)   SpO2 97%       Physical Exam  Vitals and nursing note reviewed.   Constitutional:       General: She is not in acute distress.     Appearance: Normal appearance. She is not ill-appearing or toxic-appearing.   HENT:      Head: Normocephalic and atraumatic.      Right Ear: External ear normal.      Left Ear:  External ear normal.      Nose: Nose normal.   Eyes:      General:         Right eye: No discharge.         Left eye: No discharge.      Extraocular Movements: Extraocular movements intact.      Conjunctiva/sclera: Conjunctivae normal.      Pupils: Pupils are equal, round, and reactive to light.   Cardiovascular:      Rate and Rhythm: Normal rate and regular rhythm.      Pulses: Normal pulses.      Heart sounds: Normal heart sounds.   Pulmonary:      Effort: Pulmonary effort is normal.      Breath sounds: Normal breath sounds.   Musculoskeletal:      Cervical back: Normal range of motion and neck supple.      Lumbar back: Tenderness and bony tenderness present. Decreased range of motion. Negative right straight leg raise test and negative left straight leg raise test.      Right hip: Tenderness and bony tenderness present. No deformity or crepitus. Decreased range of motion. Normal strength.      Right knee: Normal.      Comments: There is bony tenderness to the lateral aspect of the hip. TTP to greater trochanter. There is decreased ROM of the hip with pain on adduction and abduction.   Skin:     General: Skin is warm and dry.      Coloration: Skin is not jaundiced or pale.   Neurological:      General: No focal deficit present.      Mental Status: She is alert and oriented to person, place, and time.   Psychiatric:         Mood and Affect: Mood normal.         Behavior: Behavior normal.            ED Course     Labs Reviewed - No data to display  XR HIP W OR WO PELVIS 2 OR 3 VIEWS, RIGHT (CPT=73502)   Final Result   PROCEDURE: XR HIP W OR WO PELVIS 2 OR 3 VIEWS, RIGHT (CPT=73502)      INDICATIONS: lower back pain      PATIENT STATED HISTORY: Patient states she has lower back pain that    started today, denies any injury. Adds that she has chronic right groin    pain.      COMPARISON: There are no comparisons for this exam.         =====   CONCLUSION:      Postoperative changes of left total hip arthroplasty  without regional    fracture, malalignment, or evidence of hardware complication.    Mild/moderate osteoarthritis of the right hip. Obturator rings are intact.    Normal sacroiliac joints and pubic symphysis.    Lower lumbar spondylosis                        Electronically Verified and Signed by Attending Radiologist: Deborah Harrington MD 6/27/2025 7:00 PM   Workstation: [id number]          Vitals:    06/27/25 1813   BP: 140/86   Pulse: 95   Resp: 18   Temp: 98.3 °F (36.8 °C)   SpO2: 97%            [facility]        Nicky Araya, 64 year old female with medical history as noted above who presents with right hip pain    -Vital signs stable.  No apparent distress.  Patient nontoxic-appearing  -On exam, tenderness over the lateral hip/groin region noted.  Tenderness to the greater trochanter.  No visible swelling or erythema. ROM mildly reduced due to pain. Gait antalgic. No neuro deficits noted in the lower extremity.   -Hip and pelvis x-ray without any acute fractures.  There is mild to moderate degree of osteoarthritis.  There is spondylosis in the lumbar spine.  -Differential diagnosis includes but not limited AS flare, OA exacerbation, greater trochanteric bursitis, gluteal tendinopathy, or osteoporotic stress reaction. Less likely but considered: avascular necrosis, fracture, or infectious arthritis.  -No red flag signs such as inability to bear weight, high fever, night pain, or systemic illness. Conservative treatment initiated with NSAIDs, activity modification, and consideration for physical therapy.    -Patient advised on return precautions and follow-up if not improved within 5-7 days.  Patient reports that ibuprofen works best for her symptoms.  Advised that she can take 600 mg every 8 hours as needed.  She also reports in previous exacerbations like this Flexeril nightly has helped her symptoms.  Discussed follow-up with primary care doctor and orthopedic.  She may benefit from physical therapy.          **  See ED course below for additional information on care provided / interventions / notable events throughout patient's encounter.           ** I have independently reviewed the radiology images, clinical lab results, and ECG tracings as described above (if applicable)    ** Concerning co-morbidities possibly affecting complaint / care: Ankylosing spondylitis        Medical Decision Making  Amount and/or Complexity of Data Reviewed  External Data Reviewed: radiology and notes.  Radiology: ordered and independent interpretation performed. Decision-making details documented in ED Course.    Risk  OTC drugs.  Prescription drug management.        Disposition and Plan     Disposition:  Discharge  6/27/2025  7:18 pm    Clinical Impression:  1. Pain of right hip    2. Strain of muscle of right hip, initial encounter    3. Low back pain at multiple sites           Follow-up:  Matthew Bishop MD  [address] [place] [address] [id number] [phone]            Your orthopedic doctor              Medications Prescribed:  Current Discharge Medication List        START taking these medications    Details   cyclobenzaprine 10 MG Oral Tab Take 1 tablet (10 mg total) by mouth nightly for 7 days.  Qty: 7 tablet, Refills: 0      lidocaine (HM LIDOCAINE PATCH) 4 % External Patch Place 1 patch onto the skin daily for 7 days.  Qty: 7 patch, Refills: 0      ibuprofen 600 MG Oral Tab Take 1 tablet (600 mg total) by mouth every 8 (eight) hours as needed for Pain or Fever.  Qty: 21 tablet, Refills: 0               SAJI Quezada, APRN, FNP-BC  Family Nurse Practitioner  [facility]      The above patient (and/or guardian) was made aware that an appropriate evaluation has been performed, and that no additional testing is required at this time. In my medical judgment, there is currently no evidence of an immediate life-threatening or surgical condition, therefore discharge is indicated at this time. The patient (and/or  guardian) was advised that a small risk still exists that a serious condition could develop. The patient was instructed to arrange close follow-up with their primary care provider (or the referral provider given today). The patient received written and verbal instructions regarding their condition / concerns, demonstrated understanding, and is agreement with the outpatient treatment plan.            [1]   Patient Active Problem List  Diagnosis    Breast calcifications    Sclerosing adenosis of breast    Lipoma    Extrinsic asthma (HCC)    Cerebral ventriculomegaly    Vitamin B 12 deficiency    Depression    Anxiety    Backache    Breast pain    Spondylitis, ankylosing (HCC)    Ankylosing spondylitis of multiple sites in spine (HCC)    HLA B27 (HLA B27 positive)    DOROTHY positive    Essential hypertension    Abdominal pain, generalized    Anemia    Hx pulmonary embolism    Status post total replacement of left hip    Urinary tract infection, site not specified    Osteopenia    Ankylosing spondylitis (HCC)    Vaginal dryness    Syrinx ([facility])   [2]   Current Facility-Administered Medications on File Prior to Encounter   Medication Dose Route Frequency Provider Last Rate Last Admin    [COMPLETED] fluorescein sodium (Ful-Sonya) 1 MG ophthalmic strip 1 strip  1 strip Ophthalmic Once Nevin Madera PA   1 strip at 25 0911    [COMPLETED] tetracaine (Pontocaine) 0.5 % ophthalmic solution 1 drop  1 drop Ophthalmic Once Nevin Madera PA   1 drop at 25 0911     Current Outpatient Medications on File Prior to Encounter   Medication Sig Dispense Refill    losartan 25 MG Oral Tab Take 1 tablet (25 mg total) by mouth daily. 90 tablet 0    [] valACYclovir 1 G Oral Tab Take 1 tablet (1,000 mg total) by mouth 3 (three) times daily for 7 days. 21 tablet 0    sertraline 100 MG Oral Tab Take 1 tablet (100 mg total) by mouth every morning. 90 tablet 0    busPIRone 5 MG Oral Tab Take 1 tablet (5 mg total) by mouth 2  (two) times daily. 180 tablet 0    estradiol 0.1 MG/GM Vaginal Cream 0.5 gm topically twice weekly 42.5 g 3    FLUTICASONE PROPIONATE 50 MCG/ACT Nasal Suspension SHAKE LIQUID AND USE 2 SPRAYS IN EACH NOSTRIL DAILY 16 g 0    Dietary Management Product (FOSTEUM PLUS) Oral Cap Take 1 capsule by mouth in the morning and 1 capsule before bedtime. 180 capsule 3    Upadacitinib ER 15 MG Oral Tablet 24 Hr Take 15 mg by mouth daily.      Azelastine HCl 137 MCG/SPRAY Nasal Solution       BREO ELLIPTA 100-25 MCG/ACT Inhalation Aerosol Powder, Breath Activated Inhale 1 puff into the lungs daily.      Meloxicam 15 MG Oral Tab Take 0.5-1 tablets (7.5-15 mg total) by mouth daily as needed.      PROAIR  (90 BASE) MCG/ACT Inhalation Aero Soln   3

## 2025-07-07 DIAGNOSIS — F41.9 ANXIETY: ICD-10-CM

## 2025-07-09 RX ORDER — BUSPIRONE HYDROCHLORIDE 5 MG/1
5 TABLET ORAL 2 TIMES DAILY
Qty: 180 TABLET | Refills: 0 | Status: SHIPPED | OUTPATIENT
Start: 2025-07-09 | End: 2025-07-11

## 2025-07-09 NOTE — TELEPHONE ENCOUNTER
Called patient., LVM., asking for patient to call back to schedule a follow up appointment for refill request.

## 2025-07-10 RX ORDER — SERTRALINE HYDROCHLORIDE 100 MG/1
100 TABLET, FILM COATED ORAL EVERY MORNING
Qty: 90 TABLET | Refills: 1 | Status: SHIPPED | OUTPATIENT
Start: 2025-07-10

## 2025-07-10 NOTE — TELEPHONE ENCOUNTER
Please review: Medication passes protocol, but unable to refill due to medium/high/very high drug interaction warning copied here:    High  Drug-Drug: sertraline and MeloxicamToxic effects may be increased with concurrent administration of NSAIDs and Selective Serotonin Reuptake Inhibitors. The risk of upper gastrointestinal bleeding may be increased. Patients taking both drugs concurrently should be educated about the signs and symptoms of GI bleeding.    Refill passes per [facility] protocol.

## 2025-07-11 ENCOUNTER — TELEPHONE (OUTPATIENT)
Dept: SURGERY | Facility: CLINIC | Age: 65
End: 2025-07-11

## 2025-07-11 NOTE — TELEPHONE ENCOUNTER
Incoming call from patient stating her insurance has changed to [employer] as of 7/1/25     Patient is wondering if a referral would need to be placed due to updated insurance     Patient would like a callback to confirm that her referral is correct reflecting her insurance for MRI    Future Appointments   Date Time Provider Department Center   7/11/2025  2:30 PM Matthew Bishop MD [id number]   8/20/2025 11:20 AM Anisa Angulo MD [facility]   9/5/2025 12:15 PM  MR RM4 (3T WIDE)  MRI [facility]   11/4/2025  3:30 PM Heather Cunha, PA-C [facility]

## 2025-07-11 NOTE — TELEPHONE ENCOUNTER
Message below noted.    Patient insurance changed to [employer] 7/01/25. Patient is requesting feedback that everything is correct for her insurance for authorization of her MRI.    Noted MRI is scheduled for 9/05/25.    Routed to [facility] Team.

## 2025-07-23 ENCOUNTER — TELEPHONE (OUTPATIENT)
Dept: SURGERY | Facility: CLINIC | Age: 65
End: 2025-07-23

## 2025-07-23 NOTE — TELEPHONE ENCOUNTER
Patient requesting to speak with a nurse regarding intense neck pain. States it is not bad enough to go to ER but would like to discuss with clinical.

## 2025-07-24 NOTE — TELEPHONE ENCOUNTER
Hi massage and stretching is okay. She can continue to treat the symptoms conservatively. No special recommendations for her situation. She should monitor for new or worsening neurologic concerns that would warrant ED evaluation

## 2025-07-24 NOTE — TELEPHONE ENCOUNTER
Received feedback from provider and response given to patient via [employer] message.

## 2025-07-24 NOTE — TELEPHONE ENCOUNTER
I spoke to the patient, she reports that she awoke Tuesday with pain to the base of the skull radiating into the R side of neck down to the collarbone area, pain 8-9/10.     She treated at home with a lidocaine patch and 600mg of ibuprofen x multiple times throughout the day Tuesday and Wednesday with little relief.This lasted all day Tuesday and Wednesday.       Her pain is improved today as it doesn't radiate into the collarbone area now but she still has base of skull and R neck pain.      She reports that 1 week ago she had an episode of L sided neck pain where she couldn't turn head to the L but this only lasted for the day.     Denies new onset weakness, numbness, tingling and imbalance.    [place]'t want to end up worse off and in a small ER there.     She is asking if she can get massages, have someone stretch her, if she should treat this conservatively like a normal ache/pain or if this should be treated differently since it is possible chiari.     LOV 6/5/25:  \"PLAN:  - Order MRI cervical spine with and without contrast in 3 months to monitor central canal size and screen for true syrinx.  - Educate patient on Chiari red-flag symptoms (occipital cough headache, new paresthesias, imbalance); instruct to report changes promptly.  -  on fracture avoidance; maintain anti-inflammatory therapy per rheumatology.  - Continue current medications; optimize BP and weight-bearing exercise as tolerated.  - Follow-up neurosurgery clinic after cervical MRI (target September 2025).\"    Routing to provider.

## (undated) NOTE — LETTER
ASTHMA ACTION PLAN for Cindi Bonilla     : 1960     Date: 2020  Provider:  Jeana Mendoza DO  Phone for doctor or clinic: [facility] [address] [facility] [phone]

## (undated) NOTE — LETTER
ASTHMA ACTION PLAN for Avanell Mohs     : 1960     Date: 2023  Provider:  Milo Silva DO  [facility]  [address]  [phone]           You can use the colors of a traffic light to help learn about your asthma medicines. 1. Green - Go! % of Personal Best Peak Flow Use controller medicine. Breathing is good  No cough or wheeze  Can work and play Medicine How much to take When to take it    Yes                           Inhale one puff                      Daily       2. Yellow - Caution. 50-79% Personal Best Peak  Flow. Use reliever medicine to keep an asthma attack from getting bad. Cough  Wheezing  Tight Chest  Wake up at night Medicine How much to take When to take it    Albuterol 90 mcg/puff     1-2 puffs                            every 8 hrs   Bereo 200-25                  1 puff                                   daily        Additional instructions         3. Red - Stop! Danger!  <50% Personal Best Peak  Flow. Take these medications until  Get help from a doctor   Medicine not helping  Breathing is hard and fast  Nose opens wide  Can't walk  Ribs show  Can't talk well Medicine How much to take When to take it    Albuterol 90 mcg/puff     1-2 puffs                            every 4 hrs   Bereo 200-25                  1 puff                                   daily     Additional Instructions If your symptoms do not improve and you cannot contact your doctor, go to the emergency room or call 911 immediately! [x] Asthma Action Plan reviewed with patient (and caregiver if necessary) and a copy of the plan was given to the patient/caregiver. [x] Asthma Action Plan reviewed with patient (and caregiver if necessary) on the phone and mailed copy to patient or submitted via [address]      Signatures:  Provider  Milo Silva DO   Patient Caretaker

## (undated) NOTE — LETTER
01/10/19  1/10/2019    Dear Chandu Smith,     Thank you for your referral and continued support of the [facility]. I met with Deb Arias on 1/9/2019. Below is a brief overview of the visit and the current nutrition plan we have established. would like to change about her diet: less sweets and be more balanced.         Breakfast none   Snack 8:30am yogurt, fruit   Lunch 11am sandwich, raw veggies, fruit   Snack 1pm apple sauce or protein bar   Dinner Varies 5-7pm varies, casseroles, pizza, vege

## (undated) NOTE — LETTER
ASTHMA ACTION PLAN for [facility]     : 1960     Date: 2018  Provider:  Tigist Cantrell NP  Phone for doctor or clinic: [facility],  [address] [place] [address]  [address]  [address] [facility] [phone]-

## (undated) NOTE — LETTER
4/2/2019    Dear Jimbo French,    This patient may schedule with you for weight loss accountability in addition to other suggestions to help her with the upcoming allergy season.   She is doing great with her weight loss effort thus far, following a more so anti- Plan and Future Considerations:   Patient aware of risks and benefits and consented to medical nutrition therapy. Future considerations:      No Follow-up on file.   Given Katrina QUAN information for follow up visits in regards to weight loss accountab